# Patient Record
Sex: FEMALE | Race: WHITE | NOT HISPANIC OR LATINO | Employment: FULL TIME | URBAN - METROPOLITAN AREA
[De-identification: names, ages, dates, MRNs, and addresses within clinical notes are randomized per-mention and may not be internally consistent; named-entity substitution may affect disease eponyms.]

---
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
## 2024-08-29 ENCOUNTER — OFFICE VISIT (OUTPATIENT)
Dept: OBSTETRICS AND GYNECOLOGY | Facility: CLINIC | Age: 19
End: 2024-08-29
Payer: MEDICAID

## 2024-08-29 VITALS
WEIGHT: 115.31 LBS | BODY MASS INDEX: 20.43 KG/M2 | SYSTOLIC BLOOD PRESSURE: 98 MMHG | DIASTOLIC BLOOD PRESSURE: 58 MMHG | HEIGHT: 63 IN

## 2024-08-29 DIAGNOSIS — Z32.01 ENCOUNTER FOR PREGNANCY TEST, RESULT POSITIVE: ICD-10-CM

## 2024-08-29 DIAGNOSIS — Z32.00 POSSIBLE PREGNANCY: ICD-10-CM

## 2024-08-29 DIAGNOSIS — Z34.90 EARLY STAGE OF PREGNANCY: Primary | ICD-10-CM

## 2024-08-29 LAB
B-HCG UR QL: POSITIVE
CTP QC/QA: YES

## 2024-08-29 PROCEDURE — 99213 OFFICE O/P EST LOW 20 MIN: CPT | Mod: PBBFAC,TH,PN | Performed by: OBSTETRICS & GYNECOLOGY

## 2024-08-29 PROCEDURE — 99999PBSHW POCT URINE PREGNANCY: Mod: PBBFAC,,,

## 2024-08-29 PROCEDURE — 87591 N.GONORRHOEAE DNA AMP PROB: CPT | Performed by: OBSTETRICS & GYNECOLOGY

## 2024-08-29 PROCEDURE — 87086 URINE CULTURE/COLONY COUNT: CPT | Performed by: OBSTETRICS & GYNECOLOGY

## 2024-08-29 PROCEDURE — 99999 PR PBB SHADOW E&M-EST. PATIENT-LVL III: CPT | Mod: PBBFAC,,, | Performed by: OBSTETRICS & GYNECOLOGY

## 2024-08-29 PROCEDURE — 87661 TRICHOMONAS VAGINALIS AMPLIF: CPT | Performed by: OBSTETRICS & GYNECOLOGY

## 2024-08-29 PROCEDURE — 81025 URINE PREGNANCY TEST: CPT | Mod: PBBFAC,PN | Performed by: OBSTETRICS & GYNECOLOGY

## 2024-08-29 PROCEDURE — 76817 TRANSVAGINAL US OBSTETRIC: CPT | Mod: PBBFAC,PN | Performed by: OBSTETRICS & GYNECOLOGY

## 2024-08-29 PROCEDURE — 87491 CHLMYD TRACH DNA AMP PROBE: CPT | Performed by: OBSTETRICS & GYNECOLOGY

## 2024-08-29 NOTE — PROCEDURES
Procedures    ULTRASOUND:   Bedside Ultrasound Findings    EXAMINATION:  US PELVIS COMP WITH TRANSVAG OB    CLINICAL HISTORY:  UPT positive    TECHNIQUE:  Transvaginal sonography    COMPARISON:  None    FINDINGS:  1. Uterus:    Appearance: Viable mitchell intrauterine pregnancy was seen, yolk sac was seen. Crown-rump length was not seen. Gestational sac 1.17cm.         Impression      1. Single viable intrauterine pregnancy was not seen   2. Gestational sac seen, yolk sac seen, no fetal pole seen.

## 2024-08-29 NOTE — PROGRESS NOTES
"Subjective:      Denise Newell is being seen today for her first obstetrical visit.  She is at Unknown gestation.     Patient's last menstrual period was 2024.   Unsure   No prior ultrasound  No issues, cramping & headaches, breast pain.   Asthma: no inhaler or Hospitalization    Menstrual History:  OB History          0    Para   0    Term   0       0    AB   0    Living   0         SAB   0    IAB   0    Ectopic   0    Multiple   0    Live Births   0                  Risk factors: *Nulliparity    The following portions of the patient's history were reviewed and updated as appropriate: allergies, current medications, past family history, past medical history, past social history, past surgical history, and problem list.     Review of Systems  Constitutional: negative for chills and fatigue  Gastrointestinal: negative for abdominal pain, constipation, diarrhea, nausea and vomiting  Genitourinary:negative for abnormal menstrual periods, genital lesions and vaginal discharge, dysuria, frequency and hematuria     Objective:      BP (!) 98/58   Ht 5' 3" (1.6 m)   Wt 52.3 kg (115 lb 4.8 oz)   LMP 2024   BMI 20.42 kg/m²   General appearance: alert, appears stated age, and cooperative      ULTRASOUND:   Bedside Ultrasound Findings    EXAMINATION:  US PELVIS COMP WITH TRANSVAG OB    CLINICAL HISTORY:  UPT positive    TECHNIQUE:  Transvaginal sonography    COMPARISON:  None    FINDINGS:  1. Uterus:    Appearance: Viable mitchell intrauterine pregnancy was seen, yolk sac was seen. Crown-rump length was not seen. Gestational sac 1.17cm.         Impression      1. Single viable intrauterine pregnancy was not seen   2. Gestational sac seen, yolk sac seen, no fetal pole seen.     Assessment:      Pregnancy at Unknown       Plan:     Gestational sac seen, yolk sac seen, no fetal pole seen.   Advised to follow up in 2 weeks for a repeat ultrasound.   Take prenatal vitamins.     Early stage of pregnancy  - "     US OB Transvaginal; Future; Expected date: 08/29/2024    Encounter for pregnancy test, result positive  -     POCT urine pregnancy  -     Urine culture  -     Trichomonas vaginalis, RNA, Qual, Urine  -     C. trachomatis/N. gonorrhoeae by AMP DNA [facility]; Urine    Possible pregnancy        Follow up in 4 weeks.    I reviewed today her blood pressure, weight gain, urine results and  fetal heart rate.  I have reviewed the previous labs and ultrasound with the patient.   I have answered questions to her satisfaction and total of 20 minutes spend today

## 2024-08-30 LAB
BACTERIA UR CULT: NO GROWTH
C TRACH DNA SPEC QL NAA+PROBE: NOT DETECTED
N GONORRHOEA DNA SPEC QL NAA+PROBE: NOT DETECTED

## 2024-08-31 LAB
SPECIMEN SOURCE: NORMAL
T VAGINALIS RRNA SPEC QL NAA+PROBE: NEGATIVE

## 2024-09-06 ENCOUNTER — PATIENT MESSAGE (OUTPATIENT)
Dept: OBSTETRICS AND GYNECOLOGY | Facility: CLINIC | Age: 19
End: 2024-09-06
Payer: MEDICAID

## 2024-09-12 ENCOUNTER — ROUTINE PRENATAL (OUTPATIENT)
Dept: OBSTETRICS AND GYNECOLOGY | Facility: CLINIC | Age: 19
End: 2024-09-12
Payer: MEDICAID

## 2024-09-12 ENCOUNTER — HOSPITAL ENCOUNTER (OUTPATIENT)
Dept: RADIOLOGY | Facility: HOSPITAL | Age: 19
Discharge: HOME | End: 2024-09-12
Attending: OBSTETRICS & GYNECOLOGY
Payer: MEDICAID

## 2024-09-12 VITALS — WEIGHT: 117.75 LBS | DIASTOLIC BLOOD PRESSURE: 60 MMHG | SYSTOLIC BLOOD PRESSURE: 92 MMHG | BODY MASS INDEX: 20.85 KG/M2

## 2024-09-12 DIAGNOSIS — Z34.90 EARLY STAGE OF PREGNANCY: ICD-10-CM

## 2024-09-12 DIAGNOSIS — Z32.01 ENCOUNTER FOR PREGNANCY TEST, RESULT POSITIVE: Primary | ICD-10-CM

## 2024-09-12 DIAGNOSIS — Z34.90 ENCOUNTER FOR SUPERVISION OF NORMAL PREGNANCY, ANTEPARTUM, UNSPECIFIED GRAVIDITY: ICD-10-CM

## 2024-09-12 LAB
BILIRUBIN, UA POC OHS: NEGATIVE
BLOOD, UA POC OHS: NEGATIVE
CLARITY, UA POC OHS: CLEAR
COLOR, UA POC OHS: YELLOW
GLUCOSE, UA POC OHS: NEGATIVE
KETONES, UA POC OHS: NEGATIVE
LEUKOCYTES, UA POC OHS: ABNORMAL
NITRITE, UA POC OHS: NEGATIVE
PH, UA POC OHS: 7
PROTEIN, UA POC OHS: NEGATIVE
SPECIFIC GRAVITY, UA POC OHS: 1.01
UROBILINOGEN, UA POC OHS: 0.2

## 2024-09-12 PROCEDURE — 76817 TRANSVAGINAL US OBSTETRIC: CPT | Mod: TC,PN

## 2024-09-12 PROCEDURE — 99999 PR PBB SHADOW E&M-EST. PATIENT-LVL II: CPT | Mod: PBBFAC,,,

## 2024-09-12 PROCEDURE — 99212 OFFICE O/P EST SF 10 MIN: CPT | Mod: PBBFAC,25,TH,PN

## 2024-09-12 PROCEDURE — 99999PBSHW POCT URINALYSIS(INSTRUMENT): Mod: PBBFAC,,,

## 2024-09-12 PROCEDURE — 76801 OB US < 14 WKS SINGLE FETUS: CPT | Mod: TC,PN

## 2024-09-12 PROCEDURE — 81003 URINALYSIS AUTO W/O SCOPE: CPT | Mod: PBBFAC,PN

## 2024-09-12 NOTE — PROGRESS NOTES
The patient presents with complaints of ER visit 4 days ago for vaginal bleeding. Ultrasound in ER reveals subchorionic hematoma, counseled. She denies any further episodes of bleeding.     Ultrasound today, no fetal pole at last visit. Results pending, IUP with FHTs of 150 on images    ER US from 4 days ago as below:    US OB <14 Wks, TransAbd, Single Gestation  Order: [id number]  Status: Final result       Visible to patient: Yes (seen)       Next appt: 10/10/2024 at 04:00 PM in Obstetrics and Gynecology (Candice Chau MD)    0 Result Notes  Details    Reading Physician Reading Date Result Priority   Jerman Miller MD  [phone] 9/8/2024 STAT     Narrative & Impression  EXAMINATION:  US OB <14 WEEKS TRANSABDOM, SINGLE GESTATION     CLINICAL HISTORY:  vaginal bleeding;     COMPARISON:  None     FINDINGS:  Sonographic evaluation of the uterus demonstrates the uterus to measure 8.7 x 4.2 x 6.2 cm.  Intrauterine gestational sac identified sonographic evaluation of the uterus demonstrates uterus to measure 8.7 x 4.2 x 6.2 cm. Intrauterine gestational sac is identified with a small yolk sac. Fetal pole is present. Crown-rump length measurements give an estimated gestational age of 6 weeks and 4 days.  Heart rate of 138 beats per minute is identified.  Small subchronic hemorrhage is identified measuring 1.3 x 0.4 x 4.7 cm along the lateral aspect of the gestational sac. The right ovary is normal in size and echogenicity with normal vascularity. Corpus luteal cyst is noted on the right. The left ovary is not identified. No free is seen.     Impression:     Intrauterine pregnancy with estimated gestational age of 6 weeks and 4 days is identified.     Small subchorionic hemorrhage is seen.        Electronically signed by:Jerman Miller  Date:                                            09/08/2024  Time:                                           17:39       9/12/2024  19 y.o. 7w4d Estimated Date of Delivery:  25, dating reviewed.   OB History    Para Term  AB Living   1 0 0 0 0 0   SAB IAB Ectopic Multiple Live Births   0 0 0 0 0      # Outcome Date GA Lbr Rogelio/2nd Weight Sex Type Anes PTL Lv   1 Current                Prenatal labs reviewed and updated today    Review of Systems:  General ROS: negative for headache or visual changes  Breast ROS: negative for breast lumps  Gastrointestinal ROS: negative for constipation, diarrhea or nausea/vomiting  Musculoskeletal ROS: negative for pain in joints or swelling in face or hands.   Neurological ROS: negative for - headaches, numbness/tingling or visual changes      Physical Exam:  BP 92/60   Wt 53.4 kg (117 lb 11.6 oz)   LMP 2024   BMI 20.85 kg/m²   Urine Dip: Pending  Fundal Height: deferred   Fetal Heart Tones: 150 bpm  Constitutional: She is oriented to person, place, and time. She appears well-developed and well-nourished. No distress. Normal weight    Cardiovascular: Normal rate.    Pulmonary/Chest: Effort normal. No respiratory distress  Abdominal: Soft, gravid, nontender. No rebound and no guarding.     Genitourinary: Deferred     Musculoskeletal: Normal range of motion, no peripheral edema.   Neurological: She is alert and oriented to person, place, and time. Coordination normal.   Skin: Skin is warm and dry. She is not diaphoretic.  Psychiatric: She has a normal mood and affect.        Assessment:  19 y.o., at 7w4d Gestation   Patient Active Problem List   Diagnosis    Adolescent idiopathic scoliosis of thoracolumbar region     No current outpatient medications on file prior to visit.     No current facility-administered medications on file prior to visit.       Plan:   Made aware EDC  based on LMP. LMP and US within one week  Reviewed bleeding and pain precautions  Discussed genetic testing if she desires at next appt    Labs today:New OB labs  Orders today: none  MEds today:continue Prenatal vitamin  Procedures Today: US to be  reviewed by Dr Chau   Follow up 4 Weeks, bleeding/pain precautions, kick counts, labor precautions

## 2024-10-07 ENCOUNTER — PATIENT MESSAGE (OUTPATIENT)
Dept: ADMINISTRATIVE | Facility: OTHER | Age: 19
End: 2024-10-07
Payer: MEDICAID

## 2024-10-07 ENCOUNTER — ROUTINE PRENATAL (OUTPATIENT)
Dept: OBSTETRICS AND GYNECOLOGY | Facility: CLINIC | Age: 19
End: 2024-10-07
Payer: MEDICAID

## 2024-10-07 VITALS — SYSTOLIC BLOOD PRESSURE: 96 MMHG | DIASTOLIC BLOOD PRESSURE: 58 MMHG

## 2024-10-07 DIAGNOSIS — Z3A.11 11 WEEKS GESTATION OF PREGNANCY: ICD-10-CM

## 2024-10-07 DIAGNOSIS — Z34.90 ENCOUNTER FOR SUPERVISION OF NORMAL PREGNANCY, ANTEPARTUM, UNSPECIFIED GRAVIDITY: Primary | ICD-10-CM

## 2024-10-07 LAB
BILIRUBIN, UA POC OHS: NEGATIVE
BLOOD, UA POC OHS: NEGATIVE
CLARITY, UA POC OHS: CLEAR
COLOR, UA POC OHS: YELLOW
GLUCOSE, UA POC OHS: NEGATIVE
KETONES, UA POC OHS: NEGATIVE
LEUKOCYTES, UA POC OHS: NEGATIVE
NITRITE, UA POC OHS: NEGATIVE
PH, UA POC OHS: 8
PROTEIN, UA POC OHS: NEGATIVE
SPECIFIC GRAVITY, UA POC OHS: 1.02
UROBILINOGEN, UA POC OHS: 1

## 2024-10-07 PROCEDURE — 99999PBSHW PR PBB SHADOW TECHNICAL ONLY FILED TO HB: Mod: PBBFAC,,,

## 2024-10-07 PROCEDURE — 90661 CCIIV3 VAC ABX FR 0.5 ML IM: CPT | Mod: PBBFAC,PN

## 2024-10-07 PROCEDURE — 99213 OFFICE O/P EST LOW 20 MIN: CPT | Mod: TH,S$PBB,, | Performed by: OBSTETRICS & GYNECOLOGY

## 2024-10-07 PROCEDURE — 99999 PR PBB SHADOW E&M-EST. PATIENT-LVL II: CPT | Mod: PBBFAC,,, | Performed by: OBSTETRICS & GYNECOLOGY

## 2024-10-07 PROCEDURE — 99212 OFFICE O/P EST SF 10 MIN: CPT | Mod: PBBFAC,TH,PN,25 | Performed by: OBSTETRICS & GYNECOLOGY

## 2024-10-07 PROCEDURE — 90471 IMMUNIZATION ADMIN: CPT | Mod: PBBFAC,PN

## 2024-10-07 PROCEDURE — 81003 URINALYSIS AUTO W/O SCOPE: CPT | Mod: PBBFAC,PN | Performed by: OBSTETRICS & GYNECOLOGY

## 2024-10-07 PROCEDURE — 99999PBSHW POCT URINALYSIS(INSTRUMENT): Mod: PBBFAC,,,

## 2024-10-07 RX ORDER — ASPIRIN 81 MG/1
81 TABLET ORAL DAILY
Qty: 150 TABLET | Refills: 2 | Status: SHIPPED | OUTPATIENT
Start: 2024-10-07 | End: 2025-10-07

## 2024-10-07 RX ADMIN — INFLUENZA A VIRUS A/GEORGIA/12/2022 CVR-167 (H1N1) ANTIGEN (MDCK CELL DERIVED, PROPIOLACTONE INACTIVATED), INFLUENZA A VIRUS A/SYDNEY/1304/2022 (H3N2) ANTIGEN (MDCK CELL DERIVED, PROPIOLACTONE INACTIVATED), INFLUENZA B VIRUS B/SINGAPORE/WUH4618/2021 ANTIGEN (MDCK CELL DERIVED, PROPIOLACTONE INACTIVATED) 0.5 ML: 15; 15; 15 INJECTION, SUSPENSION INTRAMUSCULAR at 04:10

## 2024-10-07 NOTE — PROGRESS NOTES
Subjective:      Denise Newell is a 19 y.o. female being seen today for her obstetrical visit. She is at 11w1d gestation. Patient reports no complaints.     OB ROS: no vaginal bleeding, no leakage of fluid, no contractions/cramping, no vaginal discharge. Fetal movement: too early.    Menstrual History:  OB History          1    Para   0    Term   0       0    AB   0    Living   0         SAB   0    IAB   0    Ectopic   0    Multiple   0    Live Births   0                Patient's last menstrual period was 2024 (exact date).       Objective:      LMP 2024 (Exact Date)     Prenatal  Fetal Heart Rate: noted on US  Fundal height not measured  Not found.           Prenatal Labs:  Lab Results   Component Value Date    GROUPTRH O POS 2024    HGB Negative 2024    HCT 43.7 2024     2024    RUBELLAIMMUN Reactive 2024    HEPBSAG Non-reactive 2024    [id number] Non-reactive 2024    LABCHLA Not Detected 2024    LABNGO Not Detected 2024    LABURIN No growth 2024    [id number] Negative 2024       Assessment:      Pregnancy 11w1d      Plan:     EDC 2025 by 6wk US  Genetic: NIPT 10/7/2024  Vaccines: flu 10/7/2024  Asthma: no inhaler or Hospitalization  TSH .0271  subchorionic hematoma        1. Encounter for supervision of normal pregnancy, antepartum, unspecified   -     POCT Urinalysis(Instrument)  -     Connected MOM Enrollment  -     Assign Connected MOM Program Consent Questionnaire    2. 11 weeks gestation of pregnancy         Follow up in 4 weeks.       I reviewed today her blood pressure, weight gain, urine results and  fetal heart rate.  I have reviewed the previous labs and ultrasound with the patient.   I have answered questions to her satisfaction and total of 20 minutes spend today

## 2024-10-10 ENCOUNTER — TELEPHONE (OUTPATIENT)
Dept: OBSTETRICS AND GYNECOLOGY | Facility: CLINIC | Age: 19
End: 2024-10-10
Payer: MEDICAID

## 2024-10-10 NOTE — TELEPHONE ENCOUNTER
Called pt , pt stated she's having constipation. I received pt email so I can send her an list of medication during pregnancy. Pt understood

## 2024-10-14 ENCOUNTER — TELEPHONE (OUTPATIENT)
Dept: OBSTETRICS AND GYNECOLOGY | Facility: CLINIC | Age: 19
End: 2024-10-14
Payer: MEDICAID

## 2024-10-14 RX ORDER — ONDANSETRON 4 MG/1
4 TABLET, ORALLY DISINTEGRATING ORAL 2 TIMES DAILY
Qty: 20 TABLET | Refills: 0 | Status: SHIPPED | OUTPATIENT
Start: 2024-10-14

## 2024-10-14 NOTE — TELEPHONE ENCOUNTER
Called and told Pt  is out of the office this week , I told pt that I will let the on call doctor know and will contact her soon as I hear.

## 2024-10-14 NOTE — TELEPHONE ENCOUNTER
----- Message from Nathalia sent at 10/14/2024 11:52 AM CDT -----  Contact: self  Type: Needs Medical Advice  Who Called:  Patient  Symptoms (please be specific):  Nausea/Vomiting   How long has patient had these symptoms:  3 weeks    Pharmacy name and phone #:    [facility] - [address] [address]  Phone: [phone] Fax: [phone]      Best Call Back Number: [phone]    Additional Information: Pt states she would like to get something sent to pharm.Please call back and advise

## 2024-10-14 NOTE — TELEPHONE ENCOUNTER
----- Message from Med Assistant Mendez sent at 10/14/2024  1:16 PM CDT -----  Regarding: FW: advice  Please advise  ----- Message -----  From: Lucas Caraballo  Sent: 10/14/2024  11:44 AM CDT  To: [facility] Staff  Subject: advice                                           Type:  Needs Medical Advice    Who Called: pt    Best Call Back Number: [phone]      Additional Information: pt st that she would like a refill on ondansetron 4mg.  please call to discuss.     [facility] [address]  Phone: [phone] Fax: [phone]

## 2024-11-04 ENCOUNTER — ROUTINE PRENATAL (OUTPATIENT)
Dept: OBSTETRICS AND GYNECOLOGY | Facility: CLINIC | Age: 19
End: 2024-11-04
Payer: MEDICAID

## 2024-11-04 VITALS
BODY MASS INDEX: 20.62 KG/M2 | DIASTOLIC BLOOD PRESSURE: 52 MMHG | WEIGHT: 116.38 LBS | SYSTOLIC BLOOD PRESSURE: 102 MMHG

## 2024-11-04 DIAGNOSIS — O21.9 NAUSEA/VOMITING IN PREGNANCY: ICD-10-CM

## 2024-11-04 DIAGNOSIS — Z34.90 ENCOUNTER FOR SUPERVISION OF NORMAL PREGNANCY, ANTEPARTUM, UNSPECIFIED GRAVIDITY: Primary | ICD-10-CM

## 2024-11-04 LAB
BILIRUBIN, UA POC OHS: NEGATIVE
BLOOD, UA POC OHS: NEGATIVE
CLARITY, UA POC OHS: CLEAR
COLOR, UA POC OHS: YELLOW
GLUCOSE, UA POC OHS: NEGATIVE
KETONES, UA POC OHS: NEGATIVE
LEUKOCYTES, UA POC OHS: ABNORMAL
NITRITE, UA POC OHS: NEGATIVE
PH, UA POC OHS: 6.5
PROTEIN, UA POC OHS: 30
SPECIFIC GRAVITY, UA POC OHS: >=1.03
UROBILINOGEN, UA POC OHS: 1

## 2024-11-04 PROCEDURE — 81003 URINALYSIS AUTO W/O SCOPE: CPT | Mod: PBBFAC,PN | Performed by: OBSTETRICS & GYNECOLOGY

## 2024-11-04 PROCEDURE — 99999PBSHW POCT URINALYSIS(INSTRUMENT): Mod: PBBFAC,,,

## 2024-11-04 PROCEDURE — 99999 PR PBB SHADOW E&M-EST. PATIENT-LVL II: CPT | Mod: PBBFAC,,, | Performed by: OBSTETRICS & GYNECOLOGY

## 2024-11-04 PROCEDURE — 99212 OFFICE O/P EST SF 10 MIN: CPT | Mod: PBBFAC,TH,PN | Performed by: OBSTETRICS & GYNECOLOGY

## 2024-11-04 RX ORDER — PROMETHAZINE HYDROCHLORIDE 25 MG/1
25 TABLET ORAL EVERY 6 HOURS PRN
Qty: 30 TABLET | Refills: 1 | Status: SHIPPED | OUTPATIENT
Start: 2024-11-04

## 2024-11-04 RX ORDER — ONDANSETRON 4 MG/1
4 TABLET, ORALLY DISINTEGRATING ORAL 2 TIMES DAILY
Qty: 20 TABLET | Refills: 0 | Status: SHIPPED | OUTPATIENT
Start: 2024-11-04

## 2024-11-04 RX ORDER — PYRIDOXINE HCL (VITAMIN B6) 25 MG
25 TABLET ORAL 3 TIMES DAILY
Qty: 90 TABLET | Refills: 1 | Status: SHIPPED | OUTPATIENT
Start: 2024-11-04 | End: 2024-12-04

## 2024-11-04 NOTE — PROGRESS NOTES
Subjective:      Denise Newell is a 19 y.o. female being seen today for her obstetrical visit. She is at 15w1d gestation. Patient reports nausea and vomiting.     OB ROS: no vaginal bleeding, no leakage of fluid, no contractions/cramping, no vaginal discharge. Fetal movement: too early.    Menstrual History:  OB History          1    Para   0    Term   0       0    AB   0    Living   0         SAB   0    IAB   0    Ectopic   0    Multiple   0    Live Births   0                Patient's last menstrual period was 2024 (exact date).       Objective:      BP (!) 102/52   Wt 52.8 kg (116 lb 6.5 oz)   LMP 2024 (Exact Date)   BMI 20.62 kg/m²     Vitals  BP: (!) 102/52  Weight: 52.8 kg (116 lb 6.5 oz)  Prenatal  Fetal Heart Rate: 152  Movement: Absent  Fundal height not measured  -0.6 kg (-1 lb 5.2 oz)           Prenatal Labs:  Lab Results   Component Value Date    GROUPTRH O POS 2024    HGB Negative 2024    HCT 43.7 2024     2024    RUBELLAIMMUN Reactive 2024    HEPBSAG Non-reactive 2024    [id number] Non-reactive 2024    LABCHLA Not Detected 2024    LABNGO Not Detected 2024    LABURIN No growth 2024    [id number] Negative 2024       Assessment:      Pregnancy 15w1d      Plan:     EDC 2025 by 6wk US  Genetic: NIPT low risk, female  Nullip: ASA  Vaccines: flu 10/7/2024  Asthma: no inhaler or Hospitalization  TSH .0271  subchorionic hematoma  One elevated BP on connect MOM, normal in clinic  Nausea and vomiting: Nausea: Recommended small meals, bland diet, christian, Unisom & B6 TID. If no improvement, the Rx for phenergan.           1. Encounter for supervision of normal pregnancy, antepartum, unspecified   -     POCT Urinalysis(Instrument)  -     US OB 14+ Wks TransAbd & TransVag, Single Gestation (XPD); Future; Expected date: 2024    2. Nausea/vomiting in pregnancy  -     doxylamine succinate (UNISOM,  DOXYLAMINE,) 25 mg tablet; Take 1/2 tablet three times a day to prevent nausea/vomitting in pregnancy  -     pyridoxine, vitamin B6, (B-6) 25 MG Tab; Take 1 tablet (25 mg total) by mouth 3 (three) times daily.  Dispense: 90 tablet; Refill: 1  -     promethazine (PHENERGAN) 25 MG tablet; Take 1 tablet (25 mg total) by mouth every 6 (six) hours as needed for Nausea.  Dispense: 30 tablet; Refill: 1  -     ondansetron (ZOFRAN-ODT) 4 MG TbDL; Take 1 tablet (4 mg total) by mouth 2 (two) times daily.  Dispense: 20 tablet; Refill: 0         Follow up in 4 weeks.       I reviewed today her blood pressure, weight gain, urine results and  fetal heart rate.  I have reviewed the previous labs and ultrasound with the patient.   I have answered questions to her satisfaction and total of 20 minutes spend today

## 2024-11-10 ENCOUNTER — PATIENT MESSAGE (OUTPATIENT)
Dept: OTHER | Facility: OTHER | Age: 19
End: 2024-11-10
Payer: MEDICAID

## 2024-11-17 ENCOUNTER — PATIENT MESSAGE (OUTPATIENT)
Dept: OTHER | Facility: OTHER | Age: 19
End: 2024-11-17
Payer: MEDICAID

## 2024-11-19 ENCOUNTER — PATIENT MESSAGE (OUTPATIENT)
Dept: OBSTETRICS AND GYNECOLOGY | Facility: CLINIC | Age: 19
End: 2024-11-19
Payer: MEDICAID

## 2024-12-03 ENCOUNTER — ROUTINE PRENATAL (OUTPATIENT)
Dept: OBSTETRICS AND GYNECOLOGY | Facility: CLINIC | Age: 19
End: 2024-12-03
Payer: MEDICAID

## 2024-12-03 ENCOUNTER — HOSPITAL ENCOUNTER (OUTPATIENT)
Dept: RADIOLOGY | Facility: HOSPITAL | Age: 19
Discharge: HOME | End: 2024-12-03
Attending: OBSTETRICS & GYNECOLOGY
Payer: MEDICAID

## 2024-12-03 VITALS
DIASTOLIC BLOOD PRESSURE: 62 MMHG | BODY MASS INDEX: 21.64 KG/M2 | WEIGHT: 122.13 LBS | SYSTOLIC BLOOD PRESSURE: 112 MMHG

## 2024-12-03 DIAGNOSIS — Z34.90 ENCOUNTER FOR SUPERVISION OF NORMAL PREGNANCY, ANTEPARTUM, UNSPECIFIED GRAVIDITY: ICD-10-CM

## 2024-12-03 DIAGNOSIS — Z34.90 ENCOUNTER FOR SUPERVISION OF NORMAL PREGNANCY, ANTEPARTUM, UNSPECIFIED GRAVIDITY: Primary | ICD-10-CM

## 2024-12-03 LAB
BILIRUBIN, UA POC OHS: NEGATIVE
BLOOD, UA POC OHS: NEGATIVE
CLARITY, UA POC OHS: ABNORMAL
COLOR, UA POC OHS: YELLOW
GLUCOSE, UA POC OHS: NEGATIVE
KETONES, UA POC OHS: NEGATIVE
LEUKOCYTES, UA POC OHS: ABNORMAL
NITRITE, UA POC OHS: NEGATIVE
PH, UA POC OHS: 7
PROTEIN, UA POC OHS: NEGATIVE
SPECIFIC GRAVITY, UA POC OHS: 1.01
UROBILINOGEN, UA POC OHS: 0.2

## 2024-12-03 PROCEDURE — 99999 PR PBB SHADOW E&M-EST. PATIENT-LVL II: CPT | Mod: PBBFAC,,, | Performed by: OBSTETRICS & GYNECOLOGY

## 2024-12-03 PROCEDURE — 99213 OFFICE O/P EST LOW 20 MIN: CPT | Mod: TH,S$PBB,, | Performed by: OBSTETRICS & GYNECOLOGY

## 2024-12-03 PROCEDURE — 76805 OB US >/= 14 WKS SNGL FETUS: CPT | Mod: 26,,, | Performed by: RADIOLOGY

## 2024-12-03 PROCEDURE — 76805 OB US >/= 14 WKS SNGL FETUS: CPT | Mod: TC,PN

## 2024-12-03 PROCEDURE — 81003 URINALYSIS AUTO W/O SCOPE: CPT | Mod: PBBFAC,PN | Performed by: OBSTETRICS & GYNECOLOGY

## 2024-12-03 PROCEDURE — 99212 OFFICE O/P EST SF 10 MIN: CPT | Mod: PBBFAC,25,TH,PN | Performed by: OBSTETRICS & GYNECOLOGY

## 2024-12-03 PROCEDURE — 99999PBSHW POCT URINALYSIS(INSTRUMENT): Mod: PBBFAC,,,

## 2024-12-03 NOTE — PROGRESS NOTES
Subjective:      Denise Newell is a 19 y.o. female being seen today for her obstetrical visit. She is at 19w2d gestation. Patient reports no complaints.     OB ROS: no vaginal bleeding, no leakage of fluid, no contractions/cramping, no vaginal discharge. Fetal movement: normal.    Menstrual History:  OB History          1    Para   0    Term   0       0    AB   0    Living   0         SAB   0    IAB   0    Ectopic   0    Multiple   0    Live Births   0                Patient's last menstrual period was 2024 (exact date).       Objective:      /62   Wt 55.4 kg (122 lb 2.2 oz)   LMP 2024 (Exact Date)   BMI 21.64 kg/m²     Vitals  BP: 112/62  Weight: 55.4 kg (122 lb 2.2 oz)  Prenatal  Fetal Heart Rate: 141 U/S  Movement: Absent  Fundal height not measured  2 kg (4 lb 6.5 oz)           Prenatal Labs:  Lab Results   Component Value Date    GROUPTRH O POS 2024    HGB Negative 2024    HCT 43.7 2024     2024    RUBELLAIMMUN Reactive 2024    HEPBSAG Non-reactive 2024    [id number] Non-reactive 2024    LABCHLA Not Detected 2024    LABNGO Not Detected 2024    LABURIN No growth 2024    [id number] Negative 2024       Assessment:      Pregnancy 19w2d      Plan:     EDC 2025 by 6wk US  Nullip: aspirin  BMI: 20, recommended 25-35# gain  Genetic: NIPT low risk, female  Vaccines: flu 10/7/2024  Asthma: no inhaler or Hospitalization  TSH .0271  Nausea and vomiting: zofran, improved  UTI: Tx 2024 Keflex QID x7 days, states she has only been taking them once a day.   Ped: [facility]  Epidural  Breastfeeding and formula  Contraception: declines  Given info for STPH clases          1. Encounter for supervision of normal pregnancy, antepartum, unspecified          Follow up in 4 weeks.       I reviewed today her blood pressure, weight gain, urine results and  fetal heart rate.  I have reviewed the previous  labs and ultrasound with the patient.   I have answered questions to her satisfaction and total of 20 minutes spend today

## 2024-12-08 ENCOUNTER — PATIENT MESSAGE (OUTPATIENT)
Dept: OTHER | Facility: OTHER | Age: 19
End: 2024-12-08
Payer: MEDICAID

## 2024-12-09 ENCOUNTER — PATIENT MESSAGE (OUTPATIENT)
Dept: OBSTETRICS AND GYNECOLOGY | Facility: CLINIC | Age: 19
End: 2024-12-09
Payer: MEDICAID

## 2025-01-02 ENCOUNTER — ROUTINE PRENATAL (OUTPATIENT)
Dept: OBSTETRICS AND GYNECOLOGY | Facility: CLINIC | Age: 20
End: 2025-01-02
Payer: MEDICAID

## 2025-01-02 VITALS — WEIGHT: 130.31 LBS | BODY MASS INDEX: 23.08 KG/M2 | DIASTOLIC BLOOD PRESSURE: 56 MMHG | SYSTOLIC BLOOD PRESSURE: 98 MMHG

## 2025-01-02 DIAGNOSIS — Z34.90 ENCOUNTER FOR SUPERVISION OF NORMAL PREGNANCY, ANTEPARTUM, UNSPECIFIED GRAVIDITY: Primary | ICD-10-CM

## 2025-01-02 PROCEDURE — 81003 URINALYSIS AUTO W/O SCOPE: CPT | Mod: PBBFAC,PN | Performed by: OBSTETRICS & GYNECOLOGY

## 2025-01-02 PROCEDURE — 99213 OFFICE O/P EST LOW 20 MIN: CPT | Mod: TH,S$PBB,, | Performed by: OBSTETRICS & GYNECOLOGY

## 2025-01-02 PROCEDURE — 99999 PR PBB SHADOW E&M-EST. PATIENT-LVL II: CPT | Mod: PBBFAC,,, | Performed by: OBSTETRICS & GYNECOLOGY

## 2025-01-02 PROCEDURE — 99999PBSHW POCT URINALYSIS(INSTRUMENT): Mod: PBBFAC,,,

## 2025-01-02 PROCEDURE — 99212 OFFICE O/P EST SF 10 MIN: CPT | Mod: PBBFAC,TH,PN | Performed by: OBSTETRICS & GYNECOLOGY

## 2025-01-02 NOTE — PROGRESS NOTES
Subjective:      Denise Newell is a 19 y.o. female being seen today for her obstetrical visit. She is at 23w4d gestation. Patient reports  cramping .     OB ROS: no vaginal bleeding, no leakage of fluid, no contractions/cramping, no vaginal discharge. Fetal movement: normal.    Menstrual History:  OB History          1    Para   0    Term   0       0    AB   0    Living   0         SAB   0    IAB   0    Ectopic   0    Multiple   0    Live Births   0                Patient's last menstrual period was 2024 (exact date).       Objective:      BP (!) 98/56   Wt 59.1 kg (130 lb 4.7 oz)   LMP 2024 (Exact Date)   BMI 23.08 kg/m²     Vitals  BP: (!) 98/56  Weight: 59.1 kg (130 lb 4.7 oz)  Prenatal  Fetal Heart Rate: 142  Movement: Present  Fundal height not measured  5.7 kg (12 lb 9.1 oz)           Prenatal Labs:  Lab Results   Component Value Date    GROUPTRH O POS 2024    HGB Negative 2024    HCT 43.7 2024     2024    RUBELLAIMMUN Reactive 2024    HEPBSAG Non-reactive 2024    [id number] Non-reactive 2024    LABCHLA Not Detected 2024    LABNGO Not Detected 2024    LABURIN No growth 2024    [id number] Negative 2024       Assessment:      Pregnancy 23w4d      Plan:     EDC 2025 by 6wk   Nullip: aspirin recommend  BMI: 20, recommended 25-35# gain  Genetic: NIPT low risk, female  Vaccines: flu 10/7/2024  Asthma: no inhaler or Hospitalization  TSH .0271  Nausea and vomiting: zofran, resolved.   UTI: Tx 20  24 Keflex QID x7 days, states she has only been taking them once a day. Denies symptoms.   Ped: [facility]  Epidural  Both breast and formula  Contraception: declines  Given info for STPH classes  Wants to keep placenta, discussed policy. Advised I do not recommend ingesting the placenta.           1. Encounter for supervision of normal pregnancy, antepartum, unspecified   -     POCT  Urinalysis(Instrument)  -     OB Glucose Screen; Future; Expected date: 01/02/2025         Follow up in 4 weeks.       I reviewed today her blood pressure, weight gain, urine results and  fetal heart rate.  I have reviewed the previous labs and ultrasound with the patient.   I have answered questions to her satisfaction and total of 20 minutes spend today

## 2025-01-05 ENCOUNTER — PATIENT MESSAGE (OUTPATIENT)
Dept: OTHER | Facility: OTHER | Age: 20
End: 2025-01-05
Payer: MEDICAID

## 2025-01-14 ENCOUNTER — TELEPHONE (OUTPATIENT)
Dept: OBSTETRICS AND GYNECOLOGY | Facility: CLINIC | Age: 20
End: 2025-01-14
Payer: MEDICAID

## 2025-01-14 ENCOUNTER — PATIENT MESSAGE (OUTPATIENT)
Dept: OBSTETRICS AND GYNECOLOGY | Facility: CLINIC | Age: 20
End: 2025-01-14
Payer: MEDICAID

## 2025-01-14 NOTE — TELEPHONE ENCOUNTER
"Called pt to get pt scheduled , pt sates she wants an message sent to  to see if she have to come in before "just scheduling an appointment". I told pt I can send an message to  and get her scheduled.Pt declined , wanted  recommendations.  "

## 2025-01-14 NOTE — TELEPHONE ENCOUNTER
"Called pt to let her know  states "If she is feeling better she can keep her appointment in 2 weeks. However if she feels like she needs to be seen sooner I have openings on Friday".     Pt did not have VM set up so I will message on portal.  "

## 2025-01-14 NOTE — TELEPHONE ENCOUNTER
----- Message from Candice Chau MD sent at 1/14/2025  4:33 PM CST -----  Contact: Denise  If she is feeling better she can keep her appointment in 2 weeks. However if she feels like she needs to be seen sooner I have openings on Friday.    Candice Chau MD  ----- Message -----  From: Vanessa Motta MA  Sent: 1/14/2025   4:16 PM CST  To: Candice Chau MD    Pt wants an message sent to you asking if she should come in for an exam or wait until upcoming appointment.  ----- Message -----  From: Risa Reilly  Sent: 1/14/2025  10:51 AM CST  To: [facility] Staff    Type:  Follow up from ER visit Appointment Request     Caller is follow up appointment.   Name of Caller:Denise Walker is 25 weeks of pregnant, She went to ER on Sunday Jan/12/25 for stomach pain, it could be food poison, throwing up for around 3 days, headache, and she still feeling really bad.  Would the patient rather a call back or a response via [facility]? Call back  Phone number: [phone]  Thanks!

## 2025-01-19 ENCOUNTER — PATIENT MESSAGE (OUTPATIENT)
Dept: OTHER | Facility: OTHER | Age: 20
End: 2025-01-19
Payer: MEDICAID

## 2025-01-30 ENCOUNTER — ROUTINE PRENATAL (OUTPATIENT)
Dept: OBSTETRICS AND GYNECOLOGY | Facility: CLINIC | Age: 20
End: 2025-01-30
Payer: MEDICAID

## 2025-01-30 ENCOUNTER — LAB VISIT (OUTPATIENT)
Dept: LAB | Facility: HOSPITAL | Age: 20
End: 2025-01-30
Attending: OBSTETRICS & GYNECOLOGY
Payer: MEDICAID

## 2025-01-30 VITALS
SYSTOLIC BLOOD PRESSURE: 102 MMHG | DIASTOLIC BLOOD PRESSURE: 62 MMHG | BODY MASS INDEX: 23.67 KG/M2 | WEIGHT: 133.63 LBS

## 2025-01-30 DIAGNOSIS — Z34.90 ENCOUNTER FOR SUPERVISION OF NORMAL PREGNANCY, ANTEPARTUM, UNSPECIFIED GRAVIDITY: Primary | ICD-10-CM

## 2025-01-30 DIAGNOSIS — Z34.90 ENCOUNTER FOR SUPERVISION OF NORMAL PREGNANCY, ANTEPARTUM, UNSPECIFIED GRAVIDITY: ICD-10-CM

## 2025-01-30 LAB
BILIRUBIN, UA POC OHS: NEGATIVE
BLOOD, UA POC OHS: ABNORMAL
CLARITY, UA POC OHS: CLEAR
COLOR, UA POC OHS: YELLOW
GLUCOSE SERPL-MCNC: 106 MG/DL (ref 70–140)
GLUCOSE, UA POC OHS: NEGATIVE
KETONES, UA POC OHS: 15
LEUKOCYTES, UA POC OHS: NEGATIVE
NITRITE, UA POC OHS: NEGATIVE
PH, UA POC OHS: 6
PROTEIN, UA POC OHS: NEGATIVE
SPECIFIC GRAVITY, UA POC OHS: 1.02
UROBILINOGEN, UA POC OHS: 0.2

## 2025-01-30 PROCEDURE — 99999 PR PBB SHADOW E&M-EST. PATIENT-LVL II: CPT | Mod: PBBFAC,,,

## 2025-01-30 PROCEDURE — 99212 OFFICE O/P EST SF 10 MIN: CPT | Mod: PBBFAC,TH,PN

## 2025-01-30 PROCEDURE — 99999PBSHW POCT URINALYSIS(INSTRUMENT): Mod: PBBFAC,,,

## 2025-01-30 PROCEDURE — 81003 URINALYSIS AUTO W/O SCOPE: CPT | Mod: PBBFAC,PN

## 2025-01-30 PROCEDURE — 82950 GLUCOSE TEST: CPT | Performed by: OBSTETRICS & GYNECOLOGY

## 2025-01-30 PROCEDURE — 36415 COLL VENOUS BLD VENIPUNCTURE: CPT | Mod: PN | Performed by: OBSTETRICS & GYNECOLOGY

## 2025-01-30 PROCEDURE — 99213 OFFICE O/P EST LOW 20 MIN: CPT | Mod: TH,S$PBB,,

## 2025-01-30 RX ORDER — CEPHALEXIN 500 MG/1
500 CAPSULE ORAL 3 TIMES DAILY
COMMUNITY
Start: 2025-01-29

## 2025-01-30 NOTE — PROGRESS NOTES
"The patient presents with complaints of L&D visit yesterday at [facility] for vaginal pain. States she has a "blood clot in her vagina". AVS reviewed on pt's phone. Dx with UTI and discharged with Keflex. She is taking as prescribed.    Reports: Good fetal movements reported. No Bleeding or pains    2025  19 y.o. 27w4d Estimated Date of Delivery: 25, dating reviewed.   OB History    Para Term  AB Living   1 0 0 0 0 0   SAB IAB Ectopic Multiple Live Births   0 0 0 0 0      # Outcome Date GA Lbr Rogelio/2nd Weight Sex Type Anes PTL Lv   1 Current                Prenatal labs reviewed and updated today    Review of Systems:  General ROS: negative for headache or visual changes  Breast ROS: negative for breast lumps  Gastrointestinal ROS: negative for constipation, diarrhea or nausea/vomiting  Musculoskeletal ROS: negative for pain in joints or swelling in face or hands.   Neurological ROS: negative for - headaches, numbness/tingling or visual changes      Physical Exam:  /62   Wt 60.6 kg (133 lb 9.6 oz)   LMP 2024 (Exact Date)   BMI 23.67 kg/m²   Urine Dip: Pending  Fundal Height: deferred   Fetal Heart Tones: 130 bpm  Constitutional: She is oriented to person, place, and time. She appears well-developed and well-nourished. No distress. Normal weight   Cardiovascular: Normal rate.    Pulmonary/Chest: Effort normal. No respiratory distress  Abdominal: Soft, gravid, nontender. No rebound and no guarding.     Genitourinary: SSE performed, no bleeding. No abnormal discharge. Vulva soft, no irritation, edema or erythema    Musculoskeletal: Normal range of motion, Minimal peripheral edema.   Neurological: She is alert and oriented to person, place, and time. Coordination normal.   Skin: Skin is warm and dry. She is not diaphoretic.  Psychiatric: She has a normal mood and affect.        Assessment:  19 y.o., at 27w4d Gestation   Patient Active Problem List   Diagnosis    " Adolescent idiopathic scoliosis of thoracolumbar region     Current Outpatient Medications on File Prior to Visit   Medication Sig Dispense Refill    aspirin (ECOTRIN) 81 MG EC tablet Take 1 tablet (81 mg total) by mouth once daily. At 12 weeks start taking aspirin daily to prevent preeclampsia. 150 tablet 2    cephALEXin (KEFLEX) 500 MG capsule Take 500 mg by mouth 3 (three) times daily.      doxylamine succinate (UNISOM, DOXYLAMINE,) 25 mg tablet Take 1/2 tablet three times a day to prevent nausea/vomitting in pregnancy (Patient not taking: Reported on 1/30/2025)      ondansetron (ZOFRAN-ODT) 4 MG TbDL Take 1 tablet (4 mg total) by mouth 2 (two) times daily. (Patient not taking: Reported on 1/30/2025) 20 tablet 0    promethazine (PHENERGAN) 25 MG tablet Take 1 tablet (25 mg total) by mouth every 6 (six) hours as needed for Nausea. (Patient not taking: Reported on 1/30/2025) 30 tablet 1     No current facility-administered medications on file prior to visit.         Plan:   Discussed liberal oral hydration, take abx as prescribed.   Recommend pregnancy support band   Labs today:glucola today   Orders today: none  MEds today: none  Procedures Today: none  Follow up 2 Weeks, bleeding/pain precautions, kick counts, labor precautions     I spent a total of 20 minutes on the day of the visit. This includes face to face time and non-face to face time preparing to see the patient (eg, review of tests), obtaining and/or reviewing separately obtained history, documenting clinical information in the electronic or other health record, independently interpreting results and communicating results to the patient/family/caregiver, or care coordinator.

## 2025-02-14 ENCOUNTER — NURSE TRIAGE (OUTPATIENT)
Dept: ADMINISTRATIVE | Facility: CLINIC | Age: 20
End: 2025-02-14
Payer: MEDICAID

## 2025-02-14 NOTE — TELEPHONE ENCOUNTER
Patient states she is currently 29 weeks, 5 days gestation and c/o decreased-no fetal movement. Patient states baby has not moved today. Patient also states recent history of lightheadedness and visual changes.     Care Advice given per Pregnancy - Decreased Fetal Movement - Adult Guideline. Patient advised to Go to [facility] for evaluation. Patient also advised to contact the [facility] on Call Service for any worsening symptoms. Patient states understanding of care advice.     Reason for Disposition   Blurred vision or visual change    Additional Information   Negative: Sounds like a life-threatening emergency to the triager   Negative: Pregnant > 36 weeks (i.e., term) and having contractions or other symptoms of labor   Negative: Pregnant < 37 weeks (i.e., ) and having contractions or other symptoms of labor   Negative: Pregnant > 20 weeks and having abdominal pain   Negative: Pregnant > 20 weeks and having vaginal bleeding or spotting    Protocols used: Pregnancy - Decreased Fetal Movement-A-[place]

## 2025-02-16 ENCOUNTER — PATIENT MESSAGE (OUTPATIENT)
Dept: OTHER | Facility: OTHER | Age: 20
End: 2025-02-16
Payer: MEDICAID

## 2025-02-17 ENCOUNTER — RESULTS FOLLOW-UP (OUTPATIENT)
Dept: OBSTETRICS AND GYNECOLOGY | Facility: CLINIC | Age: 20
End: 2025-02-17

## 2025-02-17 ENCOUNTER — ROUTINE PRENATAL (OUTPATIENT)
Dept: OBSTETRICS AND GYNECOLOGY | Facility: CLINIC | Age: 20
End: 2025-02-17
Payer: MEDICAID

## 2025-02-17 ENCOUNTER — LAB VISIT (OUTPATIENT)
Dept: LAB | Facility: HOSPITAL | Age: 20
End: 2025-02-17
Attending: OBSTETRICS & GYNECOLOGY
Payer: MEDICAID

## 2025-02-17 VITALS — SYSTOLIC BLOOD PRESSURE: 100 MMHG | BODY MASS INDEX: 24.6 KG/M2 | DIASTOLIC BLOOD PRESSURE: 64 MMHG | WEIGHT: 134.5 LBS

## 2025-02-17 DIAGNOSIS — Z34.00 SUPERVISION OF NORMAL FIRST PREGNANCY, ANTEPARTUM: Primary | ICD-10-CM

## 2025-02-17 DIAGNOSIS — Z34.00 SUPERVISION OF NORMAL FIRST PREGNANCY, ANTEPARTUM: ICD-10-CM

## 2025-02-17 DIAGNOSIS — O99.013 ANEMIA IN PREGNANCY, THIRD TRIMESTER: Primary | ICD-10-CM

## 2025-02-17 DIAGNOSIS — R79.89 ABNORMAL THYROID BLOOD TEST: ICD-10-CM

## 2025-02-17 DIAGNOSIS — Z3A.30 30 WEEKS GESTATION OF PREGNANCY: ICD-10-CM

## 2025-02-17 DIAGNOSIS — Z23 NEED FOR TDAP VACCINATION: ICD-10-CM

## 2025-02-17 DIAGNOSIS — O26.843 UTERINE SIZE DATE DISCREPANCY PREGNANCY, THIRD TRIMESTER: ICD-10-CM

## 2025-02-17 LAB
BASOPHILS # BLD AUTO: 0.08 K/UL (ref 0–0.2)
BASOPHILS NFR BLD: 0.5 % (ref 0–1.9)
BILIRUBIN, UA POC OHS: NEGATIVE
BLOOD, UA POC OHS: NEGATIVE
CLARITY, UA POC OHS: CLEAR
COLOR, UA POC OHS: YELLOW
DIFFERENTIAL METHOD BLD: ABNORMAL
EOSINOPHIL # BLD AUTO: 0.1 K/UL (ref 0–0.5)
EOSINOPHIL NFR BLD: 0.6 % (ref 0–8)
ERYTHROCYTE [DISTWIDTH] IN BLOOD BY AUTOMATED COUNT: 13.5 % (ref 11.5–14.5)
GLUCOSE, UA POC OHS: NEGATIVE
HCT VFR BLD AUTO: 33.9 % (ref 37–48.5)
HGB BLD-MCNC: 10.9 G/DL (ref 12–16)
IMM GRANULOCYTES # BLD AUTO: 0.46 K/UL (ref 0–0.04)
IMM GRANULOCYTES NFR BLD AUTO: 3 % (ref 0–0.5)
KETONES, UA POC OHS: NEGATIVE
LEUKOCYTES, UA POC OHS: NEGATIVE
LYMPHOCYTES # BLD AUTO: 1.7 K/UL (ref 1–4.8)
LYMPHOCYTES NFR BLD: 10.9 % (ref 18–48)
MCH RBC QN AUTO: 29.4 PG (ref 27–31)
MCHC RBC AUTO-ENTMCNC: 32.2 G/DL (ref 32–36)
MCV RBC AUTO: 91 FL (ref 82–98)
MONOCYTES # BLD AUTO: 1.5 K/UL (ref 0.3–1)
MONOCYTES NFR BLD: 9.8 % (ref 4–15)
NEUTROPHILS # BLD AUTO: 11.5 K/UL (ref 1.8–7.7)
NEUTROPHILS NFR BLD: 75.2 % (ref 38–73)
NITRITE, UA POC OHS: NEGATIVE
NRBC BLD-RTO: 0 /100 WBC
PH, UA POC OHS: 7
PLATELET # BLD AUTO: 338 K/UL (ref 150–450)
PMV BLD AUTO: 10.1 FL (ref 9.2–12.9)
PROTEIN, UA POC OHS: NEGATIVE
RBC # BLD AUTO: 3.71 M/UL (ref 4–5.4)
SPECIFIC GRAVITY, UA POC OHS: 1.02
UROBILINOGEN, UA POC OHS: 1
WBC # BLD AUTO: 15.26 K/UL (ref 3.9–12.7)

## 2025-02-17 PROCEDURE — 90471 IMMUNIZATION ADMIN: CPT | Mod: PBBFAC,PN

## 2025-02-17 PROCEDURE — 84439 ASSAY OF FREE THYROXINE: CPT | Performed by: OBSTETRICS & GYNECOLOGY

## 2025-02-17 PROCEDURE — 85025 COMPLETE CBC W/AUTO DIFF WBC: CPT | Performed by: OBSTETRICS & GYNECOLOGY

## 2025-02-17 PROCEDURE — 36415 COLL VENOUS BLD VENIPUNCTURE: CPT | Mod: PN | Performed by: OBSTETRICS & GYNECOLOGY

## 2025-02-17 PROCEDURE — 84443 ASSAY THYROID STIM HORMONE: CPT | Performed by: OBSTETRICS & GYNECOLOGY

## 2025-02-17 PROCEDURE — 99213 OFFICE O/P EST LOW 20 MIN: CPT | Mod: PBBFAC,TH,PN,25 | Performed by: OBSTETRICS & GYNECOLOGY

## 2025-02-17 PROCEDURE — 90715 TDAP VACCINE 7 YRS/> IM: CPT | Mod: PBBFAC,PN

## 2025-02-17 PROCEDURE — 81003 URINALYSIS AUTO W/O SCOPE: CPT | Mod: PBBFAC,PN | Performed by: OBSTETRICS & GYNECOLOGY

## 2025-02-17 RX ADMIN — TETANUS TOXOID, REDUCED DIPHTHERIA TOXOID AND ACELLULAR PERTUSSIS VACCINE, ADSORBED 0.5 ML: 5; 2.5; 8; 8; 2.5 SUSPENSION INTRAMUSCULAR at 01:02

## 2025-02-17 NOTE — PROGRESS NOTES
Subjective:      Denise Newell is a 19 y.o. female being seen today for her obstetrical visit. She is at 30w1d gestation. Patient reports nausea and vomiting.     OB ROS: no vaginal bleeding, no leakage of fluid, no contractions/cramping, no vaginal discharge. Fetal movement: normal.    Menstrual History:  OB History          1    Para   0    Term   0       0    AB   0    Living   0         SAB   0    IAB   0    Ectopic   0    Multiple   0    Live Births   0                Patient's last menstrual period was 2024 (exact date).       Objective:      /64   Wt 61 kg (134 lb 7.7 oz)   LMP 2024 (Exact Date)   BMI 24.60 kg/m²     Vitals  BP: 100/64  Weight: 61 kg (134 lb 7.7 oz)  Prenatal  Fundal Height (cm): 27 cm  Fetal Heart Rate: 132  Movement: Present  Fundal height  size less than dates  7.6 kg (16 lb 12.1 oz)           Prenatal Labs:  Lab Results   Component Value Date    GROUPTRH O POS 2024    HGB Negative 2024    HCT 43.7 2024     2024    RUBELLAIMMUN Reactive 2024    HEPBSAG Non-reactive 2024    [id number] Non-reactive 2024    LABCHLA Not Detected 2024    LABNGO Not Detected 2024    LABURIN No growth 2024    OBGLUCOSESCR 106 2025    [id number] Negative 2024       Assessment:      Pregnancy 30w1d      Plan:     EDC 2025 by 6wk   Nullip: aspirin recommend  BMI: 20, recommended 25-35# gain  Genetic: NIPT low risk, female  Vaccines: flu 10/7/2024, Tdap 2025  Asthma: no inhaler or Hospitalization  TSH .0271; repeat ordered  Nausea and vomiting: zofran.   UTI: Tx 1  2024 Keflex QID x7 days, states she has only been taking them once a day. Denies symptoms.   Ped: [facility]  Epidural  Both breast and formula  Contraception: declines  Given info for [facility] classes  Wants to keep placenta, discussed policy        1. Supervision of normal first pregnancy, antepartum  -     CBC Auto  Differential; Future; Expected date: 02/17/2025    2. 30 weeks gestation of pregnancy    3. Abnormal thyroid blood test  -     TSH; Future; Expected date: 02/17/2025    4. Uterine size date discrepancy pregnancy, third trimester  -     US OB Limited 1 Or More Gestations; Future; Expected date: 03/17/2025    5. Need for Tdap vaccination         Follow up in 2 weeks.       I reviewed today her blood pressure, weight gain, urine results and  fetal heart rate.  I have reviewed the previous labs and ultrasound with the patient.   I have answered questions to her satisfaction and total of 20 minutes spend today

## 2025-02-17 NOTE — Clinical Note
Please make all her OB FU visits until delivery. She will need a visit every two weeks until 36 weeks. Then a visit weekly until 40 weeks. She will need a growth US at 36 weeks.   Afternoons only, any day

## 2025-02-18 LAB
T4 FREE SERPL-MCNC: 0.76 NG/DL (ref 0.71–1.51)
TSH SERPL DL<=0.005 MIU/L-ACNC: 0.15 UIU/ML (ref 0.4–4)

## 2025-02-27 ENCOUNTER — LAB VISIT (OUTPATIENT)
Dept: PRIMARY CARE CLINIC | Facility: CLINIC | Age: 20
End: 2025-02-27
Payer: MEDICAID

## 2025-02-27 DIAGNOSIS — O99.013 ANEMIA IN PREGNANCY, THIRD TRIMESTER: ICD-10-CM

## 2025-02-27 LAB
BASOPHILS # BLD AUTO: 0.03 K/UL (ref 0–0.2)
BASOPHILS NFR BLD: 0.3 % (ref 0–1.9)
DIFFERENTIAL METHOD BLD: ABNORMAL
EOSINOPHIL # BLD AUTO: 0.1 K/UL (ref 0–0.5)
EOSINOPHIL NFR BLD: 0.5 % (ref 0–8)
ERYTHROCYTE [DISTWIDTH] IN BLOOD BY AUTOMATED COUNT: 13.2 % (ref 11.5–14.5)
FERRITIN SERPL-MCNC: 12 NG/ML (ref 20–300)
HCT VFR BLD AUTO: 30.8 % (ref 37–48.5)
HGB BLD-MCNC: 10.1 G/DL (ref 12–16)
IMM GRANULOCYTES # BLD AUTO: 0.21 K/UL (ref 0–0.04)
IMM GRANULOCYTES NFR BLD AUTO: 1.9 % (ref 0–0.5)
IRON SERPL-MCNC: 34 UG/DL (ref 30–160)
LYMPHOCYTES # BLD AUTO: 1.3 K/UL (ref 1–4.8)
LYMPHOCYTES NFR BLD: 11.9 % (ref 18–48)
MCH RBC QN AUTO: 28.7 PG (ref 27–31)
MCHC RBC AUTO-ENTMCNC: 32.8 G/DL (ref 32–36)
MCV RBC AUTO: 88 FL (ref 82–98)
MONOCYTES # BLD AUTO: 1.2 K/UL (ref 0.3–1)
MONOCYTES NFR BLD: 11.4 % (ref 4–15)
NEUTROPHILS # BLD AUTO: 8 K/UL (ref 1.8–7.7)
NEUTROPHILS NFR BLD: 74 % (ref 38–73)
NRBC BLD-RTO: 0 /100 WBC
PLATELET # BLD AUTO: 310 K/UL (ref 150–450)
PMV BLD AUTO: 10 FL (ref 9.2–12.9)
RBC # BLD AUTO: 3.52 M/UL (ref 4–5.4)
SATURATED IRON: 6 % (ref 20–50)
TOTAL IRON BINDING CAPACITY: 601 UG/DL (ref 250–450)
TRANSFERRIN SERPL-MCNC: 406 MG/DL (ref 200–375)
WBC # BLD AUTO: 10.84 K/UL (ref 3.9–12.7)

## 2025-02-27 PROCEDURE — 85025 COMPLETE CBC W/AUTO DIFF WBC: CPT | Performed by: OBSTETRICS & GYNECOLOGY

## 2025-02-27 PROCEDURE — 84466 ASSAY OF TRANSFERRIN: CPT | Performed by: OBSTETRICS & GYNECOLOGY

## 2025-02-27 PROCEDURE — 82728 ASSAY OF FERRITIN: CPT | Performed by: OBSTETRICS & GYNECOLOGY

## 2025-02-28 ENCOUNTER — RESULTS FOLLOW-UP (OUTPATIENT)
Dept: OBSTETRICS AND GYNECOLOGY | Facility: CLINIC | Age: 20
End: 2025-02-28
Payer: MEDICAID

## 2025-02-28 DIAGNOSIS — O99.013 ANEMIA AFFECTING PREGNANCY IN THIRD TRIMESTER: Primary | ICD-10-CM

## 2025-02-28 RX ORDER — FERROUS SULFATE 325(65) MG
325 TABLET, DELAYED RELEASE (ENTERIC COATED) ORAL DAILY
Qty: 30 TABLET | Refills: 11 | Status: SHIPPED | OUTPATIENT
Start: 2025-02-28 | End: 2026-02-28

## 2025-03-02 ENCOUNTER — PATIENT MESSAGE (OUTPATIENT)
Dept: OTHER | Facility: OTHER | Age: 20
End: 2025-03-02
Payer: MEDICAID

## 2025-03-03 ENCOUNTER — HOSPITAL ENCOUNTER (OUTPATIENT)
Dept: RADIOLOGY | Facility: HOSPITAL | Age: 20
Discharge: HOME | End: 2025-03-03
Attending: OBSTETRICS & GYNECOLOGY
Payer: MEDICAID

## 2025-03-03 ENCOUNTER — ROUTINE PRENATAL (OUTPATIENT)
Dept: OBSTETRICS AND GYNECOLOGY | Facility: CLINIC | Age: 20
End: 2025-03-03
Payer: MEDICAID

## 2025-03-03 VITALS
SYSTOLIC BLOOD PRESSURE: 108 MMHG | WEIGHT: 142.19 LBS | DIASTOLIC BLOOD PRESSURE: 62 MMHG | BODY MASS INDEX: 26.01 KG/M2

## 2025-03-03 DIAGNOSIS — J45.20 MILD INTERMITTENT ASTHMA WITHOUT COMPLICATION: ICD-10-CM

## 2025-03-03 DIAGNOSIS — Z34.00 SUPERVISION OF NORMAL FIRST PREGNANCY, ANTEPARTUM: Primary | ICD-10-CM

## 2025-03-03 DIAGNOSIS — R79.89 ABNORMAL THYROID BLOOD TEST: ICD-10-CM

## 2025-03-03 DIAGNOSIS — O26.843 UTERINE SIZE DATE DISCREPANCY PREGNANCY, THIRD TRIMESTER: ICD-10-CM

## 2025-03-03 DIAGNOSIS — R06.02 SOB (SHORTNESS OF BREATH): ICD-10-CM

## 2025-03-03 DIAGNOSIS — O99.013 ANEMIA AFFECTING PREGNANCY IN THIRD TRIMESTER: ICD-10-CM

## 2025-03-03 DIAGNOSIS — Z3A.32 32 WEEKS GESTATION OF PREGNANCY: ICD-10-CM

## 2025-03-03 LAB
BACTERIA #/AREA URNS AUTO: NORMAL /HPF
BILIRUB UR QL STRIP: NEGATIVE
CLARITY UR REFRACT.AUTO: CLEAR
COLOR UR AUTO: YELLOW
GLUCOSE UR QL STRIP: NEGATIVE
HGB UR QL STRIP: NEGATIVE
KETONES UR QL STRIP: NEGATIVE
LEUKOCYTE ESTERASE UR QL STRIP: ABNORMAL
MICROSCOPIC COMMENT: NORMAL
NITRITE UR QL STRIP: NEGATIVE
PH UR STRIP: 7 [PH] (ref 5–8)
PROT UR QL STRIP: NEGATIVE
RBC #/AREA URNS AUTO: 1 /HPF (ref 0–4)
SP GR UR STRIP: 1 (ref 1–1.03)
SQUAMOUS #/AREA URNS AUTO: 8 /HPF
URN SPEC COLLECT METH UR: ABNORMAL
WBC #/AREA URNS AUTO: 4 /HPF (ref 0–5)

## 2025-03-03 PROCEDURE — 99212 OFFICE O/P EST SF 10 MIN: CPT | Mod: PBBFAC,25,TH,PN | Performed by: OBSTETRICS & GYNECOLOGY

## 2025-03-03 PROCEDURE — 90678 RSV VACC PREF BIVALENT IM: CPT | Mod: PBBFAC,PN

## 2025-03-03 PROCEDURE — 87086 URINE CULTURE/COLONY COUNT: CPT | Performed by: OBSTETRICS & GYNECOLOGY

## 2025-03-03 PROCEDURE — 99214 OFFICE O/P EST MOD 30 MIN: CPT | Mod: TH,S$PBB,, | Performed by: OBSTETRICS & GYNECOLOGY

## 2025-03-03 PROCEDURE — 76816 OB US FOLLOW-UP PER FETUS: CPT | Mod: TC,PN

## 2025-03-03 PROCEDURE — 76816 OB US FOLLOW-UP PER FETUS: CPT | Mod: 26,,, | Performed by: RADIOLOGY

## 2025-03-03 PROCEDURE — 90471 IMMUNIZATION ADMIN: CPT | Mod: PBBFAC,PN

## 2025-03-03 PROCEDURE — 99999PBSHW PR PBB SHADOW TECHNICAL ONLY FILED TO HB: Mod: PBBFAC,,,

## 2025-03-03 PROCEDURE — 81001 URINALYSIS AUTO W/SCOPE: CPT | Performed by: OBSTETRICS & GYNECOLOGY

## 2025-03-03 PROCEDURE — 99999 PR PBB SHADOW E&M-EST. PATIENT-LVL II: CPT | Mod: PBBFAC,,, | Performed by: OBSTETRICS & GYNECOLOGY

## 2025-03-03 RX ORDER — ALBUTEROL SULFATE 90 UG/1
2 INHALANT RESPIRATORY (INHALATION) EVERY 6 HOURS PRN
Qty: 18 G | Refills: 0 | Status: SHIPPED | OUTPATIENT
Start: 2025-03-03 | End: 2026-03-03

## 2025-03-03 RX ADMIN — RESPIRATORY SYNCYTIAL VIRUS VACCINE 120 MCG: KIT at 02:03

## 2025-03-03 NOTE — TELEPHONE ENCOUNTER
----- Message from Candice Chau MD sent at 2/28/2025 10:29 AM CST -----  Your recent lab work showed some values outside of normal range.  You are anemic. Your ferritin was also low, you would benefit from Iron supplementation. Rx sent. We will repeat your testing in one   month.     Candice Chau MD    ----- Message -----  From: [employer] [employer]  Sent: 2/27/2025   8:41 PM CST  To: Candice Chau MD

## 2025-03-03 NOTE — PROGRESS NOTES
Subjective:      Denise Newell is a 19 y.o. female being seen today for her obstetrical visit. She is at 32w1d gestation. Patient reports  LE swelling, hip/pelvic pain, shortness of breath/cough. Admits to history of asthma, she does not have an inhaler.  .     OB ROS: no vaginal bleeding, no leakage of fluid, no contractions/cramping, no vaginal discharge. Fetal movement: normal.    Menstrual History:  OB History          1    Para   0    Term   0       0    AB   0    Living   0         SAB   0    IAB   0    Ectopic   0    Multiple   0    Live Births   0                Patient's last menstrual period was 2024 (exact date).       Objective:      /62   Wt 64.5 kg (142 lb 3.2 oz)   LMP 2024 (Exact Date)   BMI 26.01 kg/m²     Vitals  BP: 108/62  Weight: 64.5 kg (142 lb 3.2 oz)  Prenatal  Fundal Height (cm): 30 cm  Fetal Heart Rate: 123 US  Movement: Present  Fundal height size equal to dates  11.1 kg (24 lb 7.5 oz)           Prenatal Labs:  Lab Results   Component Value Date    GROUPTRH O POS 2024    HGB 10.1 (L) 2025    HCT 30.8 (L) 2025     2025    RUBELLAIMMUN Reactive 2024    HEPBSAG Non-reactive 2024    [id number] Non-reactive 2024    LABCHLA Not Detected 2024    LABNGO Not Detected 2024    LABURIN No growth 2024    OBGLUCOSESCR 106 2025    [id number] Negative 2024       Assessment:      Pregnancy 32w1d      Plan:     EDC 2025 by 6wk US  Nullip: aspirin recommend  BMI: 20, recommended 25-35# gain  Genetic: NIPT low risk, female  Vaccines: flu 10/7/2024, Tdap 2025, RSV 3/3/2025  Asthma: no inhaler or Hospitalization, inhaler sent due to increased shortness of breath/cough  TSH .0271  Nausea and vomiting: zofran.   UTI: Tx 2024 Keflex QID x7 days, states she has only been taking them once a day. Denies symptoms.   Ped: [facility]  Epidural  Both breast and formula  Contraception:  declines  Given info for [facility] classes  Wants to keep placenta, discussed policy  Subclinical hyperthyroid: 2/18/2025, repeat labs 6-12wk  Anemia: H/H 10/30, MCV 88, Ferritin 12, prescription Iron sup.   S<D previously: Today Growth ultrasound 39%. DEBBY normal.     Complains of LE swelling, no signs of DVT, recommended compression socks and elevation  Pelvic pain/hip pain: warm bath, stretching, belly band, & Tylenol recommended  Shortness of breath: could be from anemia, she just started Iron. She has a history of asthma, will send prescription for inhaler. Will get ECHO to rule out heart issue.           1. Supervision of normal first pregnancy, antepartum  -     POCT Urinalysis(Instrument)    2. SOB (shortness of breath)  -     Echo; Future  -     albuterol (VENTOLIN HFA) 90 mcg/actuation inhaler; Inhale 2 puffs into the lungs every 6 (six) hours as needed for Wheezing. Rescue  Dispense: 18 g; Refill: 0    3. Mild intermittent asthma without complication  -     albuterol (VENTOLIN HFA) 90 mcg/actuation inhaler; Inhale 2 puffs into the lungs every 6 (six) hours as needed for Wheezing. Rescue  Dispense: 18 g; Refill: 0         Follow up in 2 weeks.       I reviewed today her blood pressure, weight gain, urine results and  fetal heart rate.  I have reviewed the previous labs and ultrasound with the patient.   I have answered questions to her satisfaction and total of 20 minutes spend today

## 2025-03-05 LAB — BACTERIA UR CULT: NORMAL

## 2025-03-06 ENCOUNTER — TELEPHONE (OUTPATIENT)
Dept: OBSTETRICS AND GYNECOLOGY | Facility: CLINIC | Age: 20
End: 2025-03-06
Payer: MEDICAID

## 2025-03-06 NOTE — TELEPHONE ENCOUNTER
----- Message from Mercedez sent at 3/6/2025 11:58 AM CST -----  Type: Needs Medical AdviceWho Called:  [facility] Call Back Number: [phone] Information: pt is calling in regards to following up on the request for her breast pump. Pt states that she was told to call the office to have it approved so that it can be sent out to her. Pt states the request was already sent to the office for her.  Please call back and advise. Thanks!

## 2025-03-17 ENCOUNTER — ROUTINE PRENATAL (OUTPATIENT)
Dept: OBSTETRICS AND GYNECOLOGY | Facility: CLINIC | Age: 20
End: 2025-03-17
Payer: MEDICAID

## 2025-03-17 VITALS — BODY MASS INDEX: 26.25 KG/M2 | WEIGHT: 143.5 LBS | SYSTOLIC BLOOD PRESSURE: 110 MMHG | DIASTOLIC BLOOD PRESSURE: 66 MMHG

## 2025-03-17 DIAGNOSIS — Z34.00 SUPERVISION OF NORMAL FIRST PREGNANCY, ANTEPARTUM: Primary | ICD-10-CM

## 2025-03-17 LAB
BILIRUBIN, UA POC OHS: NEGATIVE
BLOOD, UA POC OHS: NEGATIVE
CLARITY, UA POC OHS: CLEAR
COLOR, UA POC OHS: YELLOW
GLUCOSE, UA POC OHS: NEGATIVE
KETONES, UA POC OHS: NEGATIVE
LEUKOCYTES, UA POC OHS: ABNORMAL
NITRITE, UA POC OHS: NEGATIVE
PH, UA POC OHS: 7
PROTEIN, UA POC OHS: NEGATIVE
SPECIFIC GRAVITY, UA POC OHS: 1.02
UROBILINOGEN, UA POC OHS: 4

## 2025-03-17 PROCEDURE — 99999PBSHW POCT URINALYSIS(INSTRUMENT): Mod: PBBFAC,,,

## 2025-03-17 PROCEDURE — 81003 URINALYSIS AUTO W/O SCOPE: CPT | Mod: PBBFAC,PN | Performed by: OBSTETRICS & GYNECOLOGY

## 2025-03-17 PROCEDURE — 99213 OFFICE O/P EST LOW 20 MIN: CPT | Mod: TH,S$PBB,, | Performed by: OBSTETRICS & GYNECOLOGY

## 2025-03-17 PROCEDURE — 99213 OFFICE O/P EST LOW 20 MIN: CPT | Mod: PBBFAC,TH,PN | Performed by: OBSTETRICS & GYNECOLOGY

## 2025-03-17 PROCEDURE — 99999 PR PBB SHADOW E&M-EST. PATIENT-LVL III: CPT | Mod: PBBFAC,,, | Performed by: OBSTETRICS & GYNECOLOGY

## 2025-03-17 NOTE — PROGRESS NOTES
Subjective:      Denise Newell is a 19 y.o. female being seen today for her obstetrical visit. She is at 34w1d gestation. Patient reports no complaints.     OB ROS: no vaginal bleeding, no leakage of fluid, no contractions/cramping, no vaginal discharge. Fetal movement: normal.    Menstrual History:  OB History          1    Para   0    Term   0       0    AB   0    Living   0         SAB   0    IAB   0    Ectopic   0    Multiple   0    Live Births   0                Patient's last menstrual period was 2024 (exact date).       Objective:      /66   Wt 65.1 kg (143 lb 8.3 oz)   LMP 2024 (Exact Date)   BMI 26.25 kg/m²     Vitals  BP: 110/66  Weight: 65.1 kg (143 lb 8.3 oz)  Prenatal  Fundal Height (cm): 32 cm  Fetal Heart Rate: 150  Movement: Present  Fundal height  size less than dates  11.7 kg (25 lb 12.7 oz)           Prenatal Labs:  Lab Results   Component Value Date    GROUPTRH O POS 2024    HGB 10.1 (L) 2025    HCT 30.8 (L) 2025     2025    RUBELLAIMMUN Reactive 2024    HEPBSAG Non-reactive 2024    [id number] Non-reactive 2024    LABCHLA Not Detected 2024    LABNGO Not Detected 2024    LABURIN No significant growth 2025    OBGLUCOSESCR 106 2025    [id number] Negative 2024       Assessment:      Pregnancy 34w1d      Plan:     EDC 2025 by 6wk US  Nullip: aspirin recommend  BMI: 20, recommended 25-35# gain  Genetic: NIPT low risk, female  Vaccines: flu 10/7/2024, Tdap 2025, RSV 3/3/2025  Asthma: no inhaler or Hospitalization  TSH .0271  Nausea and vomiting: zofran.   UTI: Tx 2024  Ped: [facility]  Epidural  Both breast and formula  Contraception: declines  Given info for [facility] classes  Wants to keep placenta, discussed policy  Subclinical hyperthyroid: 2025, repeat labs 6-12wk  Anemia: H/H 10/30, MCV 88, Ferritin 12, prescription Iron sup        1. Supervision of normal first  pregnancy, antepartum  -     US OB Limited 1 Or More Gestations; Future; Expected date: 04/17/2025         Follow up in 2 weeks.       I reviewed today her blood pressure, weight gain, urine results and  fetal heart rate.  I have reviewed the previous labs and ultrasound with the patient.   I have answered questions to her satisfaction and total of 20 minutes spend today

## 2025-03-18 ENCOUNTER — RESULTS FOLLOW-UP (OUTPATIENT)
Dept: OBSTETRICS AND GYNECOLOGY | Facility: CLINIC | Age: 20
End: 2025-03-18

## 2025-03-18 ENCOUNTER — PATIENT MESSAGE (OUTPATIENT)
Dept: OBSTETRICS AND GYNECOLOGY | Facility: CLINIC | Age: 20
End: 2025-03-18
Payer: MEDICAID

## 2025-03-21 ENCOUNTER — TELEPHONE (OUTPATIENT)
Dept: OBSTETRICS AND GYNECOLOGY | Facility: CLINIC | Age: 20
End: 2025-03-21
Payer: MEDICAID

## 2025-03-21 NOTE — TELEPHONE ENCOUNTER
Returned pt call , pt states she's hurting on and off feels like contractions. I told pt that  not in clinic today I suggest her to go to L&D @ [facility]. Pt states she will go to [facility] since its closer if anything like contractions , need to be induced or any signs of labor pt states they will transfer her. Pt understood and had no further complaints

## 2025-03-21 NOTE — TELEPHONE ENCOUNTER
----- Message from Elliott sent at 3/21/2025 10:05 AM CDT -----  Contact: Self  Type: Needs Medical AdviceWho Called:  [facility] Call Back Number: [phone]  Additional Information: Called to speak with office, would not discuss over phone. Please call.

## 2025-03-23 ENCOUNTER — PATIENT MESSAGE (OUTPATIENT)
Dept: OTHER | Facility: OTHER | Age: 20
End: 2025-03-23
Payer: MEDICAID

## 2025-03-31 ENCOUNTER — HOSPITAL ENCOUNTER (OUTPATIENT)
Dept: RADIOLOGY | Facility: HOSPITAL | Age: 20
Discharge: HOME | End: 2025-03-31
Attending: OBSTETRICS & GYNECOLOGY
Payer: MEDICAID

## 2025-03-31 ENCOUNTER — RESULTS FOLLOW-UP (OUTPATIENT)
Dept: OBSTETRICS AND GYNECOLOGY | Facility: CLINIC | Age: 20
End: 2025-03-31

## 2025-03-31 ENCOUNTER — ROUTINE PRENATAL (OUTPATIENT)
Dept: OBSTETRICS AND GYNECOLOGY | Facility: CLINIC | Age: 20
End: 2025-03-31
Payer: MEDICAID

## 2025-03-31 VITALS — BODY MASS INDEX: 26.25 KG/M2 | SYSTOLIC BLOOD PRESSURE: 108 MMHG | DIASTOLIC BLOOD PRESSURE: 62 MMHG | WEIGHT: 143.5 LBS

## 2025-03-31 DIAGNOSIS — Z3A.36 36 WEEKS GESTATION OF PREGNANCY: Primary | ICD-10-CM

## 2025-03-31 DIAGNOSIS — Z34.00 SUPERVISION OF NORMAL FIRST PREGNANCY, ANTEPARTUM: ICD-10-CM

## 2025-03-31 PROCEDURE — 76815 OB US LIMITED FETUS(S): CPT | Mod: TC,PN

## 2025-03-31 PROCEDURE — 76815 OB US LIMITED FETUS(S): CPT | Mod: 26,,, | Performed by: RADIOLOGY

## 2025-03-31 PROCEDURE — 87653 STREP B DNA AMP PROBE: CPT | Performed by: OBSTETRICS & GYNECOLOGY

## 2025-03-31 PROCEDURE — 81003 URINALYSIS AUTO W/O SCOPE: CPT | Mod: PBBFAC,PN | Performed by: OBSTETRICS & GYNECOLOGY

## 2025-03-31 PROCEDURE — 99999 PR PBB SHADOW E&M-EST. PATIENT-LVL II: CPT | Mod: PBBFAC,,, | Performed by: OBSTETRICS & GYNECOLOGY

## 2025-03-31 PROCEDURE — 99212 OFFICE O/P EST SF 10 MIN: CPT | Mod: PBBFAC,25,PN | Performed by: OBSTETRICS & GYNECOLOGY

## 2025-03-31 PROCEDURE — 99999PBSHW POCT URINALYSIS(INSTRUMENT): Mod: PBBFAC,,,

## 2025-03-31 NOTE — PROGRESS NOTES
Subjective:      Denise Newell is a 19 y.o. female being seen today for her obstetrical visit. She is at 36w1d gestation. Patient reports no complaints.     OB ROS: no vaginal bleeding, no leakage of fluid, no contractions/cramping, no vaginal discharge. Fetal movement: normal.    Menstrual History:  OB History          1    Para   0    Term   0       0    AB   0    Living   0         SAB   0    IAB   0    Ectopic   0    Multiple   0    Live Births   0                Patient's last menstrual period was 2024 (exact date).       Objective:      /62   Wt 65.1 kg (143 lb 8.3 oz)   LMP 2024 (Exact Date)   BMI 26.25 kg/m²     Vitals  BP: 108/62  Weight: 65.1 kg (143 lb 8.3 oz)  Prenatal  Fetal Heart Rate: 135 US  Movement: Present  Dilation/Effacement/Station  Dilation: Closed  Effacement (%): 40  Station: -3  Fundal height size equal to dates  11.7 kg (25 lb 12.7 oz)           Prenatal Labs:  Lab Results   Component Value Date    GROUPTRH O POS 2024    HGB 10.1 (L) 2025    HCT 30.8 (L) 2025     2025    RUBELLAIMMUN Reactive 2024    HEPBSAG Non-reactive 2024    [id number] Non-reactive 2024    LABCHLA Not Detected 2024    LABNGO Not Detected 2024    LABURIN No significant growth 2025    OBGLUCOSESCR 106 2025    [id number] Negative 2024       Assessment:      Pregnancy 36w1d      Plan:     EDC 2025 by 6wk US  Nullip: aspirin recommend  BMI: 20, recommended 25-35# gain  Genetic: NIPT low risk, female  Vaccines: flu 10/7/2024, Tdap 2025, RSV 3/3/2025  Asthma: no inhaler or Hospitalization  TSH .0271  Nausea and vomiting: zofran.   UTI: Tx 2024  Ped: [facility]  Epidural  Both breast and formula  Contraception: declines  Given info for [facility] classes  Wants to keep placenta, discussed policy  Subclinical hyperthyroid: 2025, repeat labs 6-12wk  Anemia: H/H 10/30,   MCV 88, Ferritin 12,  prescription Iron sup  Offered 39wk induction of labor, considering        1. 36 weeks gestation of pregnancy  -     POCT Urinalysis(Instrument)  -     Group B Streptococcus, PCR         Follow up in 1 weeks.       I reviewed today her blood pressure, weight gain, urine results and  fetal heart rate.  I have reviewed the previous labs and ultrasound with the patient.   I have answered questions to her satisfaction and total of 20 minutes spend today

## 2025-04-02 LAB — GROUP B STREPTOCOCCUS, PCR (OHS): NEGATIVE

## 2025-04-03 ENCOUNTER — NURSE TRIAGE (OUTPATIENT)
Dept: ADMINISTRATIVE | Facility: CLINIC | Age: 20
End: 2025-04-03
Payer: MEDICAID

## 2025-04-03 ENCOUNTER — TELEPHONE (OUTPATIENT)
Dept: OBSTETRICS AND GYNECOLOGY | Facility: CLINIC | Age: 20
End: 2025-04-03
Payer: MEDICAID

## 2025-04-03 NOTE — TELEPHONE ENCOUNTER
Pt called and stated she is 36 weeks pregnant and noticed some thick white/clear discharge this morning. Pt concerned it could be her mucous plug. Pt stated she has been having some lower intermittent mild cramping for over a week that she has already spoken to her doctor about. Care advice recommends home care. Pt verbalized understanding. Pt requesting call back from OBGYN's office.  Reason for Disposition   Normal vaginal discharge in pregnancy    Additional Information   Negative: Pregnant 23 or more weeks and baby is moving less today (e.g., kick count < 5 in 1 hour or < 10 in 2 hours)   Negative: Pregnant 24 - 36 weeks () and pinkish or brownish mucous discharge  (Exception: Single episode of faint spotting when wiping, or slight spotting after intercourse or pelvic exam.)   Negative: Constant abdominal pain and present > 2 hours   Negative: Intermittent lower abdominal pain lasting > 24 hours   Negative: Patient sounds very sick or weak to the triager   Negative: Yellow or green vaginal discharge and fever   Negative: Genital area looks infected (e.g.,  draining sore, spreading redness)   Negative: Painful rash with tiny water blisters   Negative: Rash (e.g., redness, tiny bumps, sore) of genital area   Negative: Patient wants to be seen   Negative: Abnormal color vaginal discharge (i.e., yellow, green, gray)   Negative: Bad smelling vaginal discharge   Negative: Tender lump (swelling or 'ball') at vaginal opening   Negative: Home treatment > 3 days for 'yeast infection' and not improved   Negative: 4 or more episodes of vaginal infection in past year   Negative: Patient is worried they have a sexually transmitted infection (STI)   Negative: Pain with sexual intercourse (dyspareunia)    Protocols used: Pregnancy - Vaginal Afwdvimug-T-BE

## 2025-04-03 NOTE — TELEPHONE ENCOUNTER
Spoke with pt. Advised not uncommon to discharge mucous plug, rest with increased fluids if consistent continuous contractions, pt to report to L&D. Keep appt as scheduled. Pt verbalized understanding.

## 2025-04-07 ENCOUNTER — ROUTINE PRENATAL (OUTPATIENT)
Dept: OBSTETRICS AND GYNECOLOGY | Facility: CLINIC | Age: 20
End: 2025-04-07
Payer: MEDICAID

## 2025-04-07 ENCOUNTER — RESULTS FOLLOW-UP (OUTPATIENT)
Dept: OBSTETRICS AND GYNECOLOGY | Facility: CLINIC | Age: 20
End: 2025-04-07

## 2025-04-07 VITALS
BODY MASS INDEX: 26.57 KG/M2 | DIASTOLIC BLOOD PRESSURE: 60 MMHG | SYSTOLIC BLOOD PRESSURE: 106 MMHG | WEIGHT: 145.31 LBS

## 2025-04-07 DIAGNOSIS — O99.013 ANEMIA AFFECTING PREGNANCY IN THIRD TRIMESTER: ICD-10-CM

## 2025-04-07 DIAGNOSIS — Z34.00 SUPERVISION OF NORMAL FIRST PREGNANCY, ANTEPARTUM: Primary | ICD-10-CM

## 2025-04-07 LAB
BILIRUBIN, UA POC OHS: NEGATIVE
BLOOD, UA POC OHS: NEGATIVE
CLARITY, UA POC OHS: ABNORMAL
COLOR, UA POC OHS: ABNORMAL
GLUCOSE, UA POC OHS: NEGATIVE
KETONES, UA POC OHS: NEGATIVE
LEUKOCYTES, UA POC OHS: ABNORMAL
NITRITE, UA POC OHS: NEGATIVE
PH, UA POC OHS: 7
PROTEIN, UA POC OHS: ABNORMAL
SPECIFIC GRAVITY, UA POC OHS: 1.02
UROBILINOGEN, UA POC OHS: 1

## 2025-04-07 PROCEDURE — 99214 OFFICE O/P EST MOD 30 MIN: CPT | Mod: TH,S$PBB,, | Performed by: OBSTETRICS & GYNECOLOGY

## 2025-04-07 PROCEDURE — 99999 PR PBB SHADOW E&M-EST. PATIENT-LVL III: CPT | Mod: PBBFAC,,, | Performed by: OBSTETRICS & GYNECOLOGY

## 2025-04-07 PROCEDURE — 81003 URINALYSIS AUTO W/O SCOPE: CPT | Mod: PBBFAC,PN | Performed by: OBSTETRICS & GYNECOLOGY

## 2025-04-07 PROCEDURE — 99213 OFFICE O/P EST LOW 20 MIN: CPT | Mod: PBBFAC,TH,PN | Performed by: OBSTETRICS & GYNECOLOGY

## 2025-04-07 PROCEDURE — 99999PBSHW POCT URINALYSIS(INSTRUMENT): Mod: PBBFAC,,,

## 2025-04-07 RX ORDER — FERROUS SULFATE 325(65) MG
325 TABLET, DELAYED RELEASE (ENTERIC COATED) ORAL DAILY
Qty: 30 TABLET | Refills: 11 | Status: SHIPPED | OUTPATIENT
Start: 2025-04-07 | End: 2026-04-07

## 2025-04-07 NOTE — PROGRESS NOTES
Subjective:      Denise Newell is a 19 y.o. female being seen today for her obstetrical visit. She is at 37w1d gestation. Patient reports no complaints.     OB ROS: no vaginal bleeding, no leakage of fluid, no contractions/cramping, no vaginal discharge. Fetal movement: normal.    Menstrual History:  OB History          1    Para   0    Term   0       0    AB   0    Living   0         SAB   0    IAB   0    Ectopic   0    Multiple   0    Live Births   0                Patient's last menstrual period was 2024 (exact date).       Objective:      /60   Wt 65.9 kg (145 lb 4.5 oz)   LMP 2024 (Exact Date)   BMI 26.57 kg/m²     Vitals  BP: 106/60  Weight: 65.9 kg (145 lb 4.5 oz)  Prenatal  Fundal Height (cm): 36 cm  Fetal Heart Rate: 134  Movement: Present  Dilation/Effacement/Station  Dilation: Closed  Effacement (%): 50  Station: -3  Fundal height size equal to dates  12.5 kg (27 lb 8.9 oz)           Prenatal Labs:  Lab Results   Component Value Date    GROUPTRH O POS 2024    HGB 10.1 (L) 2025    HCT 30.8 (L) 2025     2025    RUBELLAIMMUN Reactive 2024    HEPBSAG Non-reactive 2024    [id number] Non-reactive 2024    LABCHLA Not Detected 2024    LABNGO Not Detected 2024    LABURIN No significant growth 2025    OBGLUCOSESCR 106 2025    [id number] Negative 2024       Assessment:      Pregnancy 37w1d      Plan:     EDC 2025 by 6wk US  GBS neg  Nullip: aspirin recommend  BMI: 20, recommended 25-35# gain  Genetic: NIPT low risk, female  Vaccines: flu 10/7/2024, Tdap 2025, RSV 3/3/2025  Asthma: no inhaler or Hospitalization  TSH .0271  Nausea and vomiting: zofran.   UTI: Tx 2024  Ped: [facility]  Epidural  Both breast and formula  Contraception: declines  Given info for [facility] classes  Wants to keep placenta, discussed policy  Subclinical hyperthyroid: 2025, repeat labs 6-12wk  Anemia: H/H  10/30, MCV 88, Ferritin 12, prescription Iron sup  Desires 39wk induction of labor         1. Supervision of normal first pregnancy, antepartum  -     POCT Urinalysis(Instrument)         Follow up in 4 weeks.       I reviewed today her blood pressure, weight gain, urine results and  fetal heart rate.  I have reviewed the previous labs and ultrasound with the patient.   I have answered questions to her satisfaction and total of 20 minutes spend today

## 2025-04-14 ENCOUNTER — ROUTINE PRENATAL (OUTPATIENT)
Dept: OBSTETRICS AND GYNECOLOGY | Facility: CLINIC | Age: 20
End: 2025-04-14
Payer: MEDICAID

## 2025-04-14 VITALS — WEIGHT: 151.88 LBS | SYSTOLIC BLOOD PRESSURE: 90 MMHG | DIASTOLIC BLOOD PRESSURE: 58 MMHG | BODY MASS INDEX: 27.78 KG/M2

## 2025-04-14 DIAGNOSIS — Z34.00 SUPERVISION OF NORMAL FIRST PREGNANCY, ANTEPARTUM: Primary | ICD-10-CM

## 2025-04-14 LAB
BILIRUBIN, UA POC OHS: NEGATIVE
BLOOD, UA POC OHS: NEGATIVE
CLARITY, UA POC OHS: CLEAR
COLOR, UA POC OHS: YELLOW
GLUCOSE, UA POC OHS: NEGATIVE
KETONES, UA POC OHS: ABNORMAL
LEUKOCYTES, UA POC OHS: NEGATIVE
NITRITE, UA POC OHS: NEGATIVE
PH, UA POC OHS: 7
PROTEIN, UA POC OHS: NEGATIVE
SPECIFIC GRAVITY, UA POC OHS: 1.01
UROBILINOGEN, UA POC OHS: 0.2

## 2025-04-14 PROCEDURE — 99999PBSHW POCT URINALYSIS(INSTRUMENT): Mod: PBBFAC,,,

## 2025-04-14 PROCEDURE — 81003 URINALYSIS AUTO W/O SCOPE: CPT | Mod: PBBFAC,PN | Performed by: OBSTETRICS & GYNECOLOGY

## 2025-04-14 PROCEDURE — 99213 OFFICE O/P EST LOW 20 MIN: CPT | Mod: PBBFAC,TH,PN | Performed by: OBSTETRICS & GYNECOLOGY

## 2025-04-14 PROCEDURE — 99214 OFFICE O/P EST MOD 30 MIN: CPT | Mod: TH,S$PBB,, | Performed by: OBSTETRICS & GYNECOLOGY

## 2025-04-14 PROCEDURE — 99999 PR PBB SHADOW E&M-EST. PATIENT-LVL III: CPT | Mod: PBBFAC,,, | Performed by: OBSTETRICS & GYNECOLOGY

## 2025-04-17 ENCOUNTER — TELEPHONE (OUTPATIENT)
Dept: OBSTETRICS AND GYNECOLOGY | Facility: CLINIC | Age: 20
End: 2025-04-17
Payer: MEDICAID

## 2025-04-17 ENCOUNTER — NURSE TRIAGE (OUTPATIENT)
Dept: ADMINISTRATIVE | Facility: CLINIC | Age: 20
End: 2025-04-17
Payer: MEDICAID

## 2025-04-17 NOTE — TELEPHONE ENCOUNTER
Pt reports 38.4 weeks pregnant, having stomach pain (upper abd, like she is sick) and not feeling the baby move as much today, Pt advised to go to the L&D per protocol, pt did mention she did not want to go to the hospital to just be sent home, discussed importance of checking on the baby if she is having decreased fetal movement. Pt encouraged to call back with any worsening symptoms or questions. She verbalized understanding.    Reason for Disposition   MODERATE-SEVERE abdominal pain (e.g., interferes with normal activities, awakens from sleep)    Additional Information   Negative: Passed out (e.g., fainted, lost consciousness, blacked out and was not responding)   Negative: Shock suspected (e.g., cold/pale/clammy skin, too weak to stand, low BP, rapid pulse)   Negative: Difficult to awaken or acting confused (e.g., disoriented, slurred speech)   Negative: SEVERE abdominal pain (e.g., excruciating), constant, and present > 1 hour   Negative: SEVERE vaginal bleeding (e.g., continuous red blood from vagina, large blood clots)   Negative: Sounds like a life-threatening emergency to the triager   Negative: Vomiting red blood or black (coffee ground) material   Negative: SEVERE headache that is not relieved with acetaminophen (e.g., Tylenol)    Protocols used: Pregnancy - Abdominal Pain Greater Than 20 Weeks EGA-A-OH

## 2025-04-17 NOTE — TELEPHONE ENCOUNTER
Called pt due to getting an message from on call nurse about decreased fetal movement and abdomen pain. I advised pt to go to [facility] to get herself and baby checked out just to be on the safe side. Pt declined and states she always get sent home and she dont have time to keep going and getting sent home. I educated pt on why she should get seen and she declined again. I told pt if she need us give us a call or message us on portal. Pt understood and had no further complaints.

## 2025-04-17 NOTE — PROGRESS NOTES
Problem: Safety  Goal: Will remain free from injury  Outcome: PROGRESSING AS EXPECTED  Note:   Educated patient on safety precautions: use of call light for assistance, treaded sock, bed in lowest position and locked, and bedside table nearby.      Problem: Bowel/Gastric:  Goal: Will not experience complications related to bowel motility  Outcome: PROGRESSING SLOWER THAN EXPECTED  Flowsheets (Taken 11/2/2019 0900)  Last BM: 10/30/19  Note:   Educated patient on use of stool softener to facilitate bowel movement.         Subjective:      Denise Newell is a 19 y.o. female being seen today for her obstetrical visit. She is at 38w4d gestation. Patient reports no complaints.     OB ROS: no vaginal bleeding, no leakage of fluid, no contractions/cramping, no vaginal discharge. Fetal movement: normal.    Menstrual History:  OB History          1    Para   0    Term   0       0    AB   0    Living   0         SAB   0    IAB   0    Ectopic   0    Multiple   0    Live Births   0                Patient's last menstrual period was 2024 (exact date).       Objective:      BP (!) 90/58   Wt 68.9 kg (151 lb 14.4 oz)   LMP 2024 (Exact Date)   BMI 27.78 kg/m²     Vitals  BP: (!) 90/58  Weight: 68.9 kg (151 lb 14.4 oz)  Prenatal  Fetal Heart Rate: 131  Movement: Present  Fundal height size equal to dates  15.5 kg (34 lb 2.7 oz)           Prenatal Labs:  Lab Results   Component Value Date    GROUPTRH O POS 2024    HGB 10.1 (L) 2025    HCT 30.8 (L) 2025     2025    RUBELLAIMMUN Reactive 2024    HEPBSAG Non-reactive 2024    [id number] Non-reactive 2024    LABCHLA Not Detected 2024    LABNGO Not Detected 2024    LABURIN No significant growth 2025    OBGLUCOSESCR 106 2025    [id number] Negative 2024       Assessment:      Pregnancy 38w4d      Plan:     EDC 2025 by 6wk US  GBS neg  Nullip: aspirin recommend  BMI: 20, recommended 25-35# gain  Genetic: NIPT low risk, female  Vaccines: flu 10/7/2024, Tdap 2025, RSV 3/3/2025  Asthma: no inhaler or Hospitalization  TSH .0271  Nausea and vomiting: zofran.   UTI: Tx 2024  Ped: [facility]  Epidural  Both breast and formula  Contraception: declines  Given info for [facility] classes  Wants to keep placenta, discussed policy  Subclinical hyperthyroid: 2025, repeat labs 6-12wk  Anemia: H/H 10/30, MCV 88, Ferritin 12, prescription Iron sup        1. Supervision of normal first pregnancy,  antepartum  -     POCT Urinalysis(Instrument)         Follow up in 1 weeks.       I reviewed today her blood pressure, weight gain, urine results and  fetal heart rate.  I have reviewed the previous labs and ultrasound with the patient.   I have answered questions to her satisfaction and total of 20 minutes spend today

## 2025-04-21 ENCOUNTER — ROUTINE PRENATAL (OUTPATIENT)
Dept: OBSTETRICS AND GYNECOLOGY | Facility: CLINIC | Age: 20
End: 2025-04-21
Payer: MEDICAID

## 2025-04-21 VITALS — WEIGHT: 147.5 LBS | DIASTOLIC BLOOD PRESSURE: 68 MMHG | BODY MASS INDEX: 26.98 KG/M2 | SYSTOLIC BLOOD PRESSURE: 118 MMHG

## 2025-04-21 DIAGNOSIS — Z3A.39 39 WEEKS GESTATION OF PREGNANCY: Primary | ICD-10-CM

## 2025-04-21 LAB
BILIRUBIN, UA POC OHS: NEGATIVE
BLOOD, UA POC OHS: ABNORMAL
CLARITY, UA POC OHS: ABNORMAL
COLOR, UA POC OHS: YELLOW
GLUCOSE, UA POC OHS: NEGATIVE
KETONES, UA POC OHS: ABNORMAL
LEUKOCYTES, UA POC OHS: ABNORMAL
NITRITE, UA POC OHS: NEGATIVE
PH, UA POC OHS: 6.5
PROTEIN, UA POC OHS: ABNORMAL
SPECIFIC GRAVITY, UA POC OHS: 1.02
UROBILINOGEN, UA POC OHS: 2

## 2025-04-21 PROCEDURE — 99999PBSHW POCT URINALYSIS(INSTRUMENT): Mod: PBBFAC,,,

## 2025-04-21 PROCEDURE — 99999 PR PBB SHADOW E&M-EST. PATIENT-LVL III: CPT | Mod: PBBFAC,,, | Performed by: OBSTETRICS & GYNECOLOGY

## 2025-04-21 PROCEDURE — 81003 URINALYSIS AUTO W/O SCOPE: CPT | Mod: PBBFAC,PN | Performed by: OBSTETRICS & GYNECOLOGY

## 2025-04-21 PROCEDURE — 99213 OFFICE O/P EST LOW 20 MIN: CPT | Mod: PBBFAC,PN | Performed by: OBSTETRICS & GYNECOLOGY

## 2025-04-21 NOTE — PROGRESS NOTES
Subjective:      Denise Newell is a 19 y.o. female being seen today for her obstetrical visit. She is at 39w1d gestation. Patient reports no complaints.     OB ROS: no vaginal bleeding, no leakage of fluid, no contractions/cramping, no vaginal discharge. Fetal movement: normal.    Menstrual History:  OB History          1    Para   0    Term   0       0    AB   0    Living   0         SAB   0    IAB   0    Ectopic   0    Multiple   0    Live Births   0                Patient's last menstrual period was 2024 (exact date).       Objective:      /68   Wt 66.9 kg (147 lb 7.8 oz)   LMP 2024 (Exact Date)   BMI 26.98 kg/m²     Vitals  BP: 118/68  Weight: 66.9 kg (147 lb 7.8 oz)  Prenatal  Fetal Heart Rate: 137  Movement: Present  Fundal height size equal to dates  13.5 kg (29 lb 12.2 oz)           Prenatal Labs:  Lab Results   Component Value Date    GROUPTRH O POS 2024    HGB 10.1 (L) 2025    HCT 30.8 (L) 2025     2025    RUBELLAIMMUN Reactive 2024    HEPBSAG Non-reactive 2024    [id number] Non-reactive 2024    LABCHLA Not Detected 2024    LABNGO Not Detected 2024    LABURIN No significant growth 2025    OBGLUCOSESCR 106 2025    [id number] Negative 2024       Assessment:      Pregnancy 39w1d      Plan:     EDC 2025 by 6wk US  GBS neg  Nullip: aspirin recommend  BMI: 20, recommended 25-35# gain  Genetic: NIPT low risk, female  Vaccines: flu 10/7/2024, Tdap 2025, RSV 3/3/2025  Asthma: no inhaler or Hospitalization  Ped: [facility]  Epidural  Both breast and formula  Contraception: declines  Given info for [facility] classes  Wants to keep placenta, discussed policy  Subclinical hyperthyroid: 2025, repeat labs 6-12wk  Anemia: H/H 1030, MCV 88, Ferritin 12, prescription Iron sup        1. 39 weeks gestation of pregnancy  -     POCT Urinalysis(Instrument)         Follow up in 1 weeks.       I reviewed  today her blood pressure, weight gain, urine results and  fetal heart rate.  I have reviewed the previous labs and ultrasound with the patient.   I have answered questions to her satisfaction and total of 20 minutes spend today

## 2025-05-01 ENCOUNTER — PATIENT MESSAGE (OUTPATIENT)
Dept: OBSTETRICS AND GYNECOLOGY | Facility: CLINIC | Age: 20
End: 2025-05-01
Payer: MEDICAID

## 2025-05-01 ENCOUNTER — TELEPHONE (OUTPATIENT)
Dept: OBSTETRICS AND GYNECOLOGY | Facility: CLINIC | Age: 20
End: 2025-05-01
Payer: MEDICAID

## 2025-05-01 NOTE — TELEPHONE ENCOUNTER
Returned pt call to let [facility] know that  have reviewed her previous message/Image and states everything looks great and not abnormal. Pt understood and had no further complaints.

## 2025-05-01 NOTE — TELEPHONE ENCOUNTER
----- Message from Michelle sent at 5/1/2025 12:12 PM CDT -----  Regarding: advice  Contact: patient  Type:  Needs Medical AdviceWho Called: patientSymptoms (please be specific):  How long has patient had these symptoms:  Pharmacy name and phone #:  Would the patient rather a call back or a response via [facility]? [facility] Call Back Number: [phone] (home) Additional Information: Patient states she took bandage off and feels like incision is going to split open.  Very sore.  No drainage. Bruised up.   Please call patient to advise.  Thanks!  ----- Message -----  From: Michelle Cintron  Sent: 5/1/2025  12:14 PM CDT  To: [facility] Staff  Subject: advice                                           Type:  Needs Medical AdviceWho Called: patientSymptoms (please be specific):  How long has patient had these symptoms:  Pharmacy name and phone #:  Would the patient rather a call back or a response via [facility]? [facility] Call Back Number: [phone] (home) Additional Information: Patient states she took bandage off and feels like incision is going to split open.  Very sore.  No drainage. Bruised up.   Please call patient to advise.  Thanks!

## 2025-05-02 ENCOUNTER — PATIENT MESSAGE (OUTPATIENT)
Dept: OBSTETRICS AND GYNECOLOGY | Facility: CLINIC | Age: 20
End: 2025-05-02
Payer: MEDICAID

## 2025-05-02 ENCOUNTER — TELEPHONE (OUTPATIENT)
Dept: OBSTETRICS AND GYNECOLOGY | Facility: CLINIC | Age: 20
End: 2025-05-02
Payer: MEDICAID

## 2025-05-02 NOTE — TELEPHONE ENCOUNTER
----- Message from Hodan sent at 5/2/2025 10:50 AM CDT -----  Type:  Same Day Appointment RequestCaller is requesting a same day appointment.  Caller declined first available appointment listed below.  Name of Caller:ptWhen is the first available appointment?Symptoms:suture concerns (bruised, looks open and green leakage)Best Call Back Number: [phone] Information:  Symptom: Suture ConcernsOutcome: Talk to a nurse or provider within 15 minutes.Reason: Caller denied all higher acuity questionsThe caller accepted this outcome.

## 2025-05-12 ENCOUNTER — PATIENT MESSAGE (OUTPATIENT)
Dept: OBSTETRICS AND GYNECOLOGY | Facility: CLINIC | Age: 20
End: 2025-05-12
Payer: MEDICAID

## 2025-05-16 ENCOUNTER — POSTPARTUM VISIT (OUTPATIENT)
Dept: OBSTETRICS AND GYNECOLOGY | Facility: CLINIC | Age: 20
End: 2025-05-16
Payer: MEDICAID

## 2025-05-16 VITALS
DIASTOLIC BLOOD PRESSURE: 58 MMHG | BODY MASS INDEX: 23.65 KG/M2 | HEIGHT: 62 IN | SYSTOLIC BLOOD PRESSURE: 96 MMHG | WEIGHT: 128.5 LBS

## 2025-05-16 DIAGNOSIS — Z09 POSTOPERATIVE EXAMINATION: ICD-10-CM

## 2025-05-16 PROCEDURE — 99999 PR PBB SHADOW E&M-EST. PATIENT-LVL II: CPT | Mod: PBBFAC,,, | Performed by: OBSTETRICS & GYNECOLOGY

## 2025-05-16 PROCEDURE — 99212 OFFICE O/P EST SF 10 MIN: CPT | Mod: PBBFAC,PN | Performed by: OBSTETRICS & GYNECOLOGY

## 2025-05-16 NOTE — PROGRESS NOTES
"Chief Complaint   Patient presents with    Postpartum Care     Check incision due to pain on each outer edge       19 y.o. here for 3 week postpartum exam with complaint, incision pain    Admitted at 39w3d due to elective induction of labor   She is s/p primary low transverse  delivery due to arrest of dilation  Information for the patient's :  Wilma Carlos [[id number]]     Apgars    Living status: Living  Apgar Component Scores:  1 min.:  5 min.:  10 min.:  15 min.:  20 min.:    Skin color:  0  0       Heart rate:  2  2       Reflex irritability:  2  2       Muscle tone:  2  2       Respiratory effort:  2  2       Total:  8  8       Apgars assigned by: DEAN MONTANORN       She has mood complaints.   Minimal lochia.   No pain.  Bottle feeding .baby  Contraception: declined  Last pap: NA  Hemoglobin   Date Value Ref Range Status   2025 10.4 (L) 12.0 - 16.0 g/dL Final   2025 11.9 (L) 12.0 - 16.0 g/dL Final     Hematocrit   Date Value Ref Range Status   2025 32.3 (L) 37.0 - 48.5 % Final   2025 34.8 (L) 37.0 - 48.5 % Final     PE:  BP (!) 96/58   Ht 5' 2" (1.575 m)   Wt 58.3 kg (128 lb 8.5 oz)   LMP 2024 (Exact Date)   Breastfeeding No   BMI 23.51 kg/m²   Body mass index is 23.51 kg/m².  Constitutional: She appears well-developed and well-nourished. No distress.   Abdominal: Soft. She exhibits no distension and no mass. There is no tenderness. There is no rebound and no guarding. Incision clean, dry, & intact   Genitourinary: OBGyn Exam  Psychiatric: She has a normal mood and affect.    Assessment:  3 week interval PP exam, doing well    Plan:  Follow up 6wk postpartum   Contraception: declined  Next pap 2025    I spent a total of 20 minutes on the day of the visit.This includes face to face time and non-face to face time preparing to see the patient (eg, review of tests), obtaining and/or reviewing separately obtained history, documenting clinical information in " the electronic or other health record, independently interpreting results and communicating results to the patient/family/caregiver, or care coordinator.

## 2025-06-03 ENCOUNTER — TELEPHONE (OUTPATIENT)
Dept: OBSTETRICS AND GYNECOLOGY | Facility: CLINIC | Age: 20
End: 2025-06-03
Payer: MEDICAID

## 2025-06-05 ENCOUNTER — PATIENT MESSAGE (OUTPATIENT)
Dept: OBSTETRICS AND GYNECOLOGY | Facility: CLINIC | Age: 20
End: 2025-06-05
Payer: MEDICAID